# Patient Record
Sex: FEMALE | Race: WHITE | NOT HISPANIC OR LATINO | Employment: FULL TIME | URBAN - NONMETROPOLITAN AREA
[De-identification: names, ages, dates, MRNs, and addresses within clinical notes are randomized per-mention and may not be internally consistent; named-entity substitution may affect disease eponyms.]

---

## 2017-07-17 ENCOUNTER — TRANSCRIBE ORDERS (OUTPATIENT)
Dept: ADMINISTRATIVE | Facility: HOSPITAL | Age: 57
End: 2017-07-17

## 2017-07-17 DIAGNOSIS — Z12.31 VISIT FOR SCREENING MAMMOGRAM: Primary | ICD-10-CM

## 2017-07-17 DIAGNOSIS — M81.0 OSTEOPOROSIS, UNSPECIFIED: ICD-10-CM

## 2017-08-09 ENCOUNTER — HOSPITAL ENCOUNTER (OUTPATIENT)
Dept: MAMMOGRAPHY | Facility: HOSPITAL | Age: 57
Discharge: HOME | End: 2017-08-09
Admitting: NURSE PRACTITIONER

## 2017-08-09 ENCOUNTER — HOSPITAL ENCOUNTER (OUTPATIENT)
Dept: BONE DENSITY | Facility: HOSPITAL | Age: 57
Discharge: HOME | End: 2017-08-09

## 2017-08-09 DIAGNOSIS — M81.0 OSTEOPOROSIS, UNSPECIFIED: ICD-10-CM

## 2017-08-09 DIAGNOSIS — Z12.31 VISIT FOR SCREENING MAMMOGRAM: ICD-10-CM

## 2017-08-09 PROCEDURE — 77080 DXA BONE DENSITY AXIAL: CPT

## 2017-08-09 PROCEDURE — 77063 BREAST TOMOSYNTHESIS BI: CPT | Performed by: RADIOLOGY

## 2017-08-09 PROCEDURE — 77067 SCR MAMMO BI INCL CAD: CPT | Performed by: RADIOLOGY

## 2017-08-09 PROCEDURE — 77080 DXA BONE DENSITY AXIAL: CPT | Performed by: RADIOLOGY

## 2017-08-09 PROCEDURE — G0202 SCR MAMMO BI INCL CAD: HCPCS

## 2017-08-09 PROCEDURE — 77063 BREAST TOMOSYNTHESIS BI: CPT

## 2018-09-11 ENCOUNTER — OFFICE VISIT (OUTPATIENT)
Dept: SURGERY | Facility: CLINIC | Age: 58
End: 2018-09-11

## 2018-09-11 VITALS
HEART RATE: 68 BPM | BODY MASS INDEX: 24.89 KG/M2 | TEMPERATURE: 98.6 F | DIASTOLIC BLOOD PRESSURE: 93 MMHG | SYSTOLIC BLOOD PRESSURE: 137 MMHG | RESPIRATION RATE: 18 BRPM | WEIGHT: 149.4 LBS | OXYGEN SATURATION: 96 % | HEIGHT: 65 IN

## 2018-09-11 DIAGNOSIS — I83.813 VARICOSE VEINS OF BOTH LOWER EXTREMITIES WITH PAIN: Primary | ICD-10-CM

## 2018-09-11 PROCEDURE — 99203 OFFICE O/P NEW LOW 30 MIN: CPT | Performed by: SURGERY

## 2018-09-11 RX ORDER — LEVOCETIRIZINE DIHYDROCHLORIDE 5 MG/1
TABLET, FILM COATED ORAL
Refills: 5 | COMMUNITY
Start: 2018-08-11

## 2018-09-11 RX ORDER — DOCUSATE SODIUM 100 MG/1
100 CAPSULE, LIQUID FILLED ORAL 2 TIMES DAILY
COMMUNITY

## 2018-09-11 RX ORDER — LEVOTHYROXINE SODIUM 88 UG/1
TABLET ORAL
COMMUNITY
Start: 2018-07-03

## 2018-09-11 RX ORDER — VIT C/B6/B5/MAGNESIUM/HERB 173 50-5-6-5MG
CAPSULE ORAL
COMMUNITY

## 2018-09-11 RX ORDER — VITAMIN B COMPLEX
CAPSULE ORAL DAILY
COMMUNITY

## 2018-09-11 NOTE — PROGRESS NOTES
9/11/2018    Patient Information  Annmarie Hopkins  [address]  [place] [address]  1960  [phone] (home)     Chief Complaint   Patient presents with   • Consult     REFERRED FOR VERICOSE VEINS CONSULT       HPI  Patient is a 58-year-old white female concerned about spider varicosities she is has bilateral lower legs.  She is also concerned about her leg circulation.  Her sister has had problems with her carotid arteries and she is concerned she has problems with circulation in her legs as well.  She denies MI, stroke, diabetes, hypertension, TIAs, chest pain.  She denies claudication.  She is having some discomfort in bilateral lower legs    Review of Systems   Constitutional: Positive for unexpected weight change.   HENT: Positive for sore throat and voice change.    Eyes: Positive for visual disturbance.   Respiratory: Negative.    Cardiovascular: Negative.    Gastrointestinal: Negative.    Endocrine: Negative.    Genitourinary: Negative.    Musculoskeletal: Negative.    Skin: Negative.    Allergic/Immunologic: Negative.    Neurological: Positive for numbness.   Hematological: Bruises/bleeds easily.   Psychiatric/Behavioral: Positive for sleep disturbance.     The Review of Systems has been reviewed and confirmed.    There is no problem list on file for this patient.        Past Medical History:   Diagnosis Date   • Arthritis    • Hypothyroid          Past Surgical History:   Procedure Laterality Date   • LAPAROSCOPIC TUBAL LIGATION     • THYROIDECTOMY  2014         Family History   Problem Relation Age of Onset   • Cancer Mother    • Hypertension Father    • Heart disease Father    • Diabetes Father    • Heart disease Paternal Grandfather          Social History   Substance Use Topics   • Smoking status: Never Smoker   • Smokeless tobacco: Not on file   • Alcohol use Not on file       Current Outpatient Prescriptions   Medication Sig Dispense Refill   • B Complex Vitamins (VITAMIN B COMPLEX) capsule  "capsule Take  by mouth Daily.     • Calcium Carb-Cholecalciferol (CALCIUM 600+D3) 600-200 MG-UNIT tablet Take  by mouth.     • docusate sodium (STOOL SOFTENER) 100 MG capsule Take 100 mg by mouth 2 (Two) Times a Day.     • levocetirizine (XYZAL) 5 MG tablet TAKE 1 2 TO 1 TABLET BY MOUTH EVERY EVENING  5   • levothyroxine (SYNTHROID, LEVOTHROID) 88 MCG tablet      • Turmeric 500 MG capsule Take  by mouth.       No current facility-administered medications for this visit.          Allergies  Morphine    /93   Pulse 68   Temp 98.6 °F (37 °C)   Resp 18   Ht 165.1 cm (65\")   Wt 67.8 kg (149 lb 6.4 oz)   SpO2 96%   BMI 24.86 kg/m²      Physical Exam   Constitutional: She is oriented to person, place, and time. She appears well-developed and well-nourished. No distress.   HENT:   Head: Normocephalic.   Right Ear: External ear normal.   Left Ear: External ear normal.   Nose: Nose normal.   Mouth/Throat: Oropharynx is clear and moist.   Eyes: Conjunctivae and EOM are normal. Right eye exhibits no discharge. Left eye exhibits no discharge.   Neck: Normal range of motion. No JVD present. No tracheal deviation present. No thyromegaly present.   Cardiovascular: Normal rate, regular rhythm, normal heart sounds and intact distal pulses.  Exam reveals no gallop and no friction rub.    No murmur heard.  Excellent pedal pulses.  Multiple spider varicosities both legs   Pulmonary/Chest: Effort normal and breath sounds normal. No stridor. No respiratory distress. She has no wheezes. She has no rales. She exhibits no tenderness.   Abdominal: Soft. Bowel sounds are normal. She exhibits no distension and no mass. There is no tenderness. There is no rebound and no guarding. No hernia.   Genitourinary: Rectal exam shows guaiac negative stool.   Musculoskeletal: Normal range of motion. She exhibits no edema, tenderness or deformity.   Lymphadenopathy:     She has no cervical adenopathy.   Neurological: She is alert and oriented " to person, place, and time. She has normal reflexes. She displays normal reflexes. No cranial nerve deficit. She exhibits normal muscle tone. Coordination normal.   Skin: Skin is warm and dry. No rash noted. She is not diaphoretic. No erythema. No pallor.   Psychiatric: She has a normal mood and affect. Her behavior is normal. Judgment and thought content normal.             Assessment   Spider varicosities both legs with concerned about circulation to lower extremities.        Plan     Venous Doppler study bilateral lower extremities, bilateral ABIs  \    Patient's Body mass index is 24.86 kg/m². BMI is within normal parameters. No follow-up required.      Dexter Bee MD

## 2018-09-12 ENCOUNTER — HOSPITAL ENCOUNTER (OUTPATIENT)
Dept: MAMMOGRAPHY | Facility: HOSPITAL | Age: 58
Discharge: HOME | End: 2018-09-12
Admitting: NURSE PRACTITIONER

## 2018-09-12 DIAGNOSIS — Z12.39 SCREENING BREAST EXAMINATION: ICD-10-CM

## 2018-09-12 PROCEDURE — 77063 BREAST TOMOSYNTHESIS BI: CPT

## 2018-09-12 PROCEDURE — 77063 BREAST TOMOSYNTHESIS BI: CPT | Performed by: RADIOLOGY

## 2018-09-12 PROCEDURE — 77067 SCR MAMMO BI INCL CAD: CPT

## 2018-09-12 PROCEDURE — 77067 SCR MAMMO BI INCL CAD: CPT | Performed by: RADIOLOGY

## 2018-09-13 ENCOUNTER — TELEPHONE (OUTPATIENT)
Dept: SURGERY | Facility: CLINIC | Age: 58
End: 2018-09-13

## 2019-09-25 ENCOUNTER — HOSPITAL ENCOUNTER (OUTPATIENT)
Dept: MAMMOGRAPHY | Facility: HOSPITAL | Age: 59
Discharge: HOME | End: 2019-09-25
Admitting: NURSE PRACTITIONER

## 2019-09-25 DIAGNOSIS — Z12.39 SCREENING BREAST EXAMINATION: ICD-10-CM

## 2019-09-25 PROCEDURE — 77063 BREAST TOMOSYNTHESIS BI: CPT

## 2019-09-25 PROCEDURE — 77067 SCR MAMMO BI INCL CAD: CPT

## 2019-09-25 PROCEDURE — 77063 BREAST TOMOSYNTHESIS BI: CPT | Performed by: RADIOLOGY

## 2019-09-25 PROCEDURE — 77067 SCR MAMMO BI INCL CAD: CPT | Performed by: RADIOLOGY

## 2020-06-04 ENCOUNTER — HOSPITAL ENCOUNTER (OUTPATIENT)
Dept: GENERAL RADIOLOGY | Facility: HOSPITAL | Age: 60
Discharge: HOME | End: 2020-06-04
Admitting: NURSE PRACTITIONER

## 2020-06-04 ENCOUNTER — TRANSCRIBE ORDERS (OUTPATIENT)
Dept: ADMINISTRATIVE | Facility: HOSPITAL | Age: 60
End: 2020-06-04

## 2020-06-04 DIAGNOSIS — R07.9 CHEST PAIN, UNSPECIFIED TYPE: Primary | ICD-10-CM

## 2020-06-04 DIAGNOSIS — R07.9 CHEST PAIN, UNSPECIFIED TYPE: ICD-10-CM

## 2020-06-04 PROCEDURE — 71046 X-RAY EXAM CHEST 2 VIEWS: CPT

## 2020-06-04 PROCEDURE — 71046 X-RAY EXAM CHEST 2 VIEWS: CPT | Performed by: RADIOLOGY

## 2020-06-05 ENCOUNTER — TRANSCRIBE ORDERS (OUTPATIENT)
Dept: ADMINISTRATIVE | Facility: HOSPITAL | Age: 60
End: 2020-06-05

## 2020-06-05 ENCOUNTER — LAB (OUTPATIENT)
Dept: LAB | Facility: HOSPITAL | Age: 60
End: 2020-06-05

## 2020-06-05 DIAGNOSIS — R06.00 ACUTE DYSPNEA: Primary | ICD-10-CM

## 2020-06-05 DIAGNOSIS — M94.0 COSTOCHONDRITIS, ACUTE: ICD-10-CM

## 2020-06-05 DIAGNOSIS — R06.00 ACUTE DYSPNEA: ICD-10-CM

## 2020-06-05 PROCEDURE — U0003 INFECTIOUS AGENT DETECTION BY NUCLEIC ACID (DNA OR RNA); SEVERE ACUTE RESPIRATORY SYNDROME CORONAVIRUS 2 (SARS-COV-2) (CORONAVIRUS DISEASE [COVID-19]), AMPLIFIED PROBE TECHNIQUE, MAKING USE OF HIGH THROUGHPUT TECHNOLOGIES AS DESCRIBED BY CMS-2020-01-R: HCPCS

## 2020-06-07 LAB — SARS-COV-2 RNA RESP QL NAA+PROBE: NOT DETECTED

## 2020-11-23 ENCOUNTER — HOSPITAL ENCOUNTER (OUTPATIENT)
Dept: MAMMOGRAPHY | Facility: HOSPITAL | Age: 60
Discharge: HOME | End: 2020-11-23
Admitting: NURSE PRACTITIONER

## 2020-11-23 DIAGNOSIS — Z12.31 VISIT FOR SCREENING MAMMOGRAM: ICD-10-CM

## 2020-11-23 PROCEDURE — 77063 BREAST TOMOSYNTHESIS BI: CPT | Performed by: RADIOLOGY

## 2020-11-23 PROCEDURE — 77067 SCR MAMMO BI INCL CAD: CPT | Performed by: RADIOLOGY

## 2020-11-23 PROCEDURE — 77067 SCR MAMMO BI INCL CAD: CPT

## 2020-11-23 PROCEDURE — 77063 BREAST TOMOSYNTHESIS BI: CPT

## 2021-03-18 ENCOUNTER — BULK ORDERING (OUTPATIENT)
Dept: CASE MANAGEMENT | Facility: OTHER | Age: 61
End: 2021-03-18

## 2021-03-18 DIAGNOSIS — Z23 IMMUNIZATION DUE: ICD-10-CM

## 2021-04-29 ENCOUNTER — TRANSCRIBE ORDERS (OUTPATIENT)
Dept: ADMINISTRATIVE | Facility: HOSPITAL | Age: 61
End: 2021-04-29

## 2021-04-29 DIAGNOSIS — Z01.818 OTHER SPECIFIED PRE-OPERATIVE EXAMINATION: Primary | ICD-10-CM

## 2021-04-30 ENCOUNTER — LAB (OUTPATIENT)
Dept: LAB | Facility: HOSPITAL | Age: 61
End: 2021-04-30

## 2021-04-30 DIAGNOSIS — Z01.818 OTHER SPECIFIED PRE-OPERATIVE EXAMINATION: ICD-10-CM

## 2021-04-30 PROCEDURE — C9803 HOPD COVID-19 SPEC COLLECT: HCPCS

## 2021-04-30 PROCEDURE — U0004 COV-19 TEST NON-CDC HGH THRU: HCPCS

## 2021-05-01 LAB — SARS-COV-2 RNA NOSE QL NAA+PROBE: NOT DETECTED

## 2021-06-07 ENCOUNTER — HOSPITAL ENCOUNTER (OUTPATIENT)
Dept: BONE DENSITY | Facility: HOSPITAL | Age: 61
Discharge: HOME | End: 2021-06-07
Admitting: NURSE PRACTITIONER

## 2021-06-07 DIAGNOSIS — M81.0 OSTEOPOROSIS, POST-MENOPAUSAL: ICD-10-CM

## 2021-06-07 PROCEDURE — 77080 DXA BONE DENSITY AXIAL: CPT

## 2021-06-07 PROCEDURE — 77080 DXA BONE DENSITY AXIAL: CPT | Performed by: RADIOLOGY

## 2021-12-10 ENCOUNTER — TRANSCRIBE ORDERS (OUTPATIENT)
Dept: ADMINISTRATIVE | Facility: HOSPITAL | Age: 61
End: 2021-12-10

## 2021-12-10 DIAGNOSIS — I83.811 VARICOSE VEINS OF RIGHT LOWER EXTREMITY WITH PAIN: Primary | ICD-10-CM

## 2021-12-17 ENCOUNTER — HOSPITAL ENCOUNTER (OUTPATIENT)
Dept: CARDIOLOGY | Facility: HOSPITAL | Age: 61
Discharge: HOME | End: 2021-12-17
Admitting: SURGERY

## 2021-12-17 VITALS — BODY MASS INDEX: 23.32 KG/M2 | HEIGHT: 65 IN | WEIGHT: 140 LBS

## 2021-12-17 DIAGNOSIS — I83.811 VARICOSE VEINS OF RIGHT LOWER EXTREMITY WITH PAIN: ICD-10-CM

## 2021-12-17 PROCEDURE — 93971 EXTREMITY STUDY: CPT

## 2021-12-18 LAB
BH CV LOW VAS RIGHT GREATER SAPH AK VESSEL: 1
BH CV LOW VAS RIGHT GREATER SAPH BK VESSEL: 1
BH CV LOW VAS RIGHT GSV DIST CALF VESSEL: 1
BH CV LOW VAS RIGHT GSV DIST THIGH VESSEL: 1
BH CV LOW VAS RIGHT GSV MID CALF VESSEL: 1
BH CV LOW VAS RIGHT GSV MID THIGH VESSEL: 1
BH CV LOW VAS RIGHT SAPHENOFEM VESSEL: 1
BH CV LOW VAS RIGHT VARICOSITY BK VESSEL: 1
BH CV LOWER VAS RIGHT GSV DIST THIGH COMPRESSIBILTY: NORMAL
BH CV LOWER VAS RIGHT GSV MID CALF COMPRESSIBILTY: NORMAL
BH CV LOWER VAS RIGHT GSV MID THIGH COMPRESSIBILTY: NORMAL
BH CV LOWER VASCULAR RIGHT AA GSV COMPETENT: NORMAL
BH CV LOWER VASCULAR RIGHT AA GSV COMPRESS: NORMAL
BH CV LOWER VASCULAR RIGHT COMMON FEMORAL AUGMENT: NORMAL
BH CV LOWER VASCULAR RIGHT COMMON FEMORAL COMPETENT: NORMAL
BH CV LOWER VASCULAR RIGHT COMMON FEMORAL COMPRESS: NORMAL
BH CV LOWER VASCULAR RIGHT COMMON FEMORAL PHASIC: NORMAL
BH CV LOWER VASCULAR RIGHT COMMON FEMORAL SPONT: NORMAL
BH CV LOWER VASCULAR RIGHT DISTAL FEMORAL AUGMENT: NORMAL
BH CV LOWER VASCULAR RIGHT DISTAL FEMORAL COMPETENT: NORMAL
BH CV LOWER VASCULAR RIGHT DISTAL FEMORAL COMPRESS: NORMAL
BH CV LOWER VASCULAR RIGHT DISTAL FEMORAL PHASIC: NORMAL
BH CV LOWER VASCULAR RIGHT DISTAL FEMORAL SPONT: NORMAL
BH CV LOWER VASCULAR RIGHT GREATER SAPH AK COMPETENT: NORMAL
BH CV LOWER VASCULAR RIGHT GREATER SAPH BK COMPETENT: NORMAL
BH CV LOWER VASCULAR RIGHT GREATER SAPH BK COMPRESS: NORMAL
BH CV LOWER VASCULAR RIGHT GSV DIST THIGH COMPETENT: NORMAL
BH CV LOWER VASCULAR RIGHT GSV MID CALF COMPETENT: NORMAL
BH CV LOWER VASCULAR RIGHT GSV MID THIGH COMPETENT: NORMAL
BH CV LOWER VASCULAR RIGHT MID FEMORAL AUGMENT: NORMAL
BH CV LOWER VASCULAR RIGHT MID FEMORAL COMPETENT: NORMAL
BH CV LOWER VASCULAR RIGHT MID FEMORAL COMPRESS: NORMAL
BH CV LOWER VASCULAR RIGHT MID FEMORAL PHASIC: NORMAL
BH CV LOWER VASCULAR RIGHT MID FEMORAL SPONT: NORMAL
BH CV LOWER VASCULAR RIGHT PERONEAL COMPRESS: NORMAL
BH CV LOWER VASCULAR RIGHT POPLITEAL AUGMENT: NORMAL
BH CV LOWER VASCULAR RIGHT POPLITEAL COMPETENT: NORMAL
BH CV LOWER VASCULAR RIGHT POPLITEAL COMPRESS: NORMAL
BH CV LOWER VASCULAR RIGHT POPLITEAL PHASIC: NORMAL
BH CV LOWER VASCULAR RIGHT POPLITEAL SPONT: NORMAL
BH CV LOWER VASCULAR RIGHT POSTERIOR TIBIAL COMPRESS: NORMAL
BH CV LOWER VASCULAR RIGHT PROXIMAL FEMORAL AUGMENT: NORMAL
BH CV LOWER VASCULAR RIGHT PROXIMAL FEMORAL COMPETENT: NORMAL
BH CV LOWER VASCULAR RIGHT PROXIMAL FEMORAL COMPRESS: NORMAL
BH CV LOWER VASCULAR RIGHT PROXIMAL FEMORAL PHASIC: NORMAL
BH CV LOWER VASCULAR RIGHT PROXIMAL FEMORAL SPONT: NORMAL
BH CV LOWER VASCULAR RIGHT SAPHENOFEMORAL JUNCTION AUGMENT: NORMAL
BH CV LOWER VASCULAR RIGHT SAPHENOFEMORAL JUNCTION COMPETENT: NORMAL
BH CV LOWER VASCULAR RIGHT SAPHENOFEMORAL JUNCTION COMPRESS: NORMAL
BH CV LOWER VASCULAR RIGHT SAPHENOFEMORAL JUNCTION PHASIC: NORMAL
BH CV LOWER VASCULAR RIGHT SAPHENOFEMORAL JUNCTION SPONT: NORMAL
BH CV LOWER VASCULAR RIGHT SAPHENOPOP JX AUGMENT: NORMAL
BH CV LOWER VASCULAR RIGHT SAPHENOPOP JX COMPETENT: NORMAL
BH CV LOWER VASCULAR RIGHT SAPHENOPOP JX COMPRESS: NORMAL
BH CV LOWER VASCULAR RIGHT SAPHENOPOP JX PHASIC: NORMAL
BH CV LOWER VASCULAR RIGHT SAPHENOPOP JX SPONT: NORMAL
BH CV LOWER VASCULAR RIGHT SSV MID CALF COMPETENT: NORMAL
BH CV LOWER VASCULAR RIGHT SSV MID CALF COMPRESS: NORMAL
BH CV LOWER VASCULAR RIGHT VARICOSITY BK COMPETENT: NORMAL
BH CV LOWER VASCULAR RIGHT VARICOSITY BK COMPRESS: NORMAL
BH CV RIGHT LOWER VAS AA GSV REFLUX TIME: 0 MSEC
BH CV RIGHT LOWER VAS AA GSV TRANS DIAMETER: 0.4 CM
BH CV RIGHT LOWER VAS GSV KNEE REFLUX TIME: 5340 MSEC
BH CV RIGHT LOWER VAS GSV KNEE TRANS DIAMETER: 0.6 CM
BH CV RIGHT LOWER VAS GSV PROX CALF REFLUX TIME: 3286 MSEC
BH CV RIGHT LOWER VAS GSV PROX CALF TRANS DIAMETER: 5 CM
BH CV RIGHT LOWER VAS GSV PROX THIGH REFLUX TIME: 4856 MSEC
BH CV RIGHT LOWER VAS GSV PROX THIGH TRANS DIAMETER: 0.6 CM
BH CV RIGHT LOWER VAS SAPHENOFEM JUNCTION REFLUX TIME: 4474 MSEC
BH CV RIGHT LOWER VAS SAPHENOFEM JUNCTION TRANSVERSE DIAMETER: 0.8 CM
BH CV RIGHT LOWER VAS SPJ REFLUX TIME: 0 MSEC
BH CV RIGHT LOWER VAS SPJ TRANS DIAMETER: 0.4 CM
BH CV RIGHT LOWER VAS SSV MID CALF REFLUX TIME: 0 MSEC
BH CV RIGHT LOWER VAS SSV MID CALF TRANS DIAMETER: 0.41 CM
BH CV RIGHT LOWER VAS VARICOSITY BK TRANS DIAMETER: 0.71 CM
BH CV VAS RIGHT GSV PROXIMAL HIDDEN LRR COMPRESSIBILTY: NORMAL

## 2022-01-05 ENCOUNTER — HOSPITAL ENCOUNTER (OUTPATIENT)
Dept: MAMMOGRAPHY | Facility: HOSPITAL | Age: 62
Discharge: HOME | End: 2022-01-05
Admitting: NURSE PRACTITIONER

## 2022-01-05 DIAGNOSIS — Z12.31 VISIT FOR SCREENING MAMMOGRAM: ICD-10-CM

## 2022-01-05 PROCEDURE — 77063 BREAST TOMOSYNTHESIS BI: CPT

## 2022-01-05 PROCEDURE — 77063 BREAST TOMOSYNTHESIS BI: CPT | Performed by: RADIOLOGY

## 2022-01-05 PROCEDURE — 77067 SCR MAMMO BI INCL CAD: CPT | Performed by: RADIOLOGY

## 2022-01-05 PROCEDURE — 77067 SCR MAMMO BI INCL CAD: CPT

## 2022-08-26 ENCOUNTER — TRANSCRIBE ORDERS (OUTPATIENT)
Dept: ADMINISTRATIVE | Facility: HOSPITAL | Age: 62
End: 2022-08-26

## 2022-08-26 DIAGNOSIS — I83.811 VARICOSE VEINS OF RIGHT LOWER EXTREMITY WITH PAIN: Primary | ICD-10-CM

## 2022-09-07 ENCOUNTER — HOSPITAL ENCOUNTER (OUTPATIENT)
Dept: CARDIOLOGY | Facility: HOSPITAL | Age: 62
Discharge: HOME | End: 2022-09-07
Admitting: SURGERY

## 2022-09-07 VITALS — BODY MASS INDEX: 24.16 KG/M2 | HEIGHT: 65 IN | WEIGHT: 145 LBS

## 2022-09-07 DIAGNOSIS — I83.811 VARICOSE VEINS OF RIGHT LOWER EXTREMITY WITH PAIN: ICD-10-CM

## 2022-09-07 PROCEDURE — 93971 EXTREMITY STUDY: CPT

## 2022-09-07 PROCEDURE — 93971 EXTREMITY STUDY: CPT | Performed by: INTERNAL MEDICINE

## 2022-09-08 LAB
BH CV LOW VAS RIGHT GREAT SAPH AK CM FIELD: 1.2 CM
BH CV LOW VAS RIGHT GREATER SAPH AK VESSEL: 1
BH CV LOW VAS RIGHT GREATER SAPH BK VESSEL: 1
BH CV LOW VAS RIGHT VARICOSITY BK VESSEL: 1
BH CV LOWER VASCULAR RIGHT COMMON FEMORAL AUGMENT: NORMAL
BH CV LOWER VASCULAR RIGHT COMMON FEMORAL COMPETENT: NORMAL
BH CV LOWER VASCULAR RIGHT COMMON FEMORAL COMPRESS: NORMAL
BH CV LOWER VASCULAR RIGHT COMMON FEMORAL PHASIC: NORMAL
BH CV LOWER VASCULAR RIGHT COMMON FEMORAL SPONT: NORMAL
BH CV LOWER VASCULAR RIGHT DISTAL FEMORAL AUGMENT: NORMAL
BH CV LOWER VASCULAR RIGHT DISTAL FEMORAL COMPETENT: NORMAL
BH CV LOWER VASCULAR RIGHT DISTAL FEMORAL COMPRESS: NORMAL
BH CV LOWER VASCULAR RIGHT DISTAL FEMORAL PHASIC: NORMAL
BH CV LOWER VASCULAR RIGHT DISTAL FEMORAL SPONT: NORMAL
BH CV LOWER VASCULAR RIGHT GASTRONEMIUS COMPRESS: NORMAL
BH CV LOWER VASCULAR RIGHT GREATER SAPH AK COMPRESS: NORMAL
BH CV LOWER VASCULAR RIGHT GREATER SAPH AK THROMBUS: NORMAL
BH CV LOWER VASCULAR RIGHT GREATER SAPH BK COMPRESS: NORMAL
BH CV LOWER VASCULAR RIGHT GREATER SAPH BK THROMBUS: NORMAL
BH CV LOWER VASCULAR RIGHT LESSER SAPH COMPRESS: NORMAL
BH CV LOWER VASCULAR RIGHT MID FEMORAL AUGMENT: NORMAL
BH CV LOWER VASCULAR RIGHT MID FEMORAL COMPETENT: NORMAL
BH CV LOWER VASCULAR RIGHT MID FEMORAL COMPRESS: NORMAL
BH CV LOWER VASCULAR RIGHT MID FEMORAL PHASIC: NORMAL
BH CV LOWER VASCULAR RIGHT MID FEMORAL SPONT: NORMAL
BH CV LOWER VASCULAR RIGHT PERONEAL COMPRESS: NORMAL
BH CV LOWER VASCULAR RIGHT POPLITEAL AUGMENT: NORMAL
BH CV LOWER VASCULAR RIGHT POPLITEAL COMPETENT: NORMAL
BH CV LOWER VASCULAR RIGHT POPLITEAL COMPRESS: NORMAL
BH CV LOWER VASCULAR RIGHT POPLITEAL PHASIC: NORMAL
BH CV LOWER VASCULAR RIGHT POPLITEAL SPONT: NORMAL
BH CV LOWER VASCULAR RIGHT POSTERIOR TIBIAL COMPRESS: NORMAL
BH CV LOWER VASCULAR RIGHT PROFUNDA FEMORAL AUGMENT: NORMAL
BH CV LOWER VASCULAR RIGHT PROFUNDA FEMORAL COMPETENT: NORMAL
BH CV LOWER VASCULAR RIGHT PROFUNDA FEMORAL COMPRESS: NORMAL
BH CV LOWER VASCULAR RIGHT PROFUNDA FEMORAL PHASIC: NORMAL
BH CV LOWER VASCULAR RIGHT PROFUNDA FEMORAL SPONT: NORMAL
BH CV LOWER VASCULAR RIGHT PROXIMAL FEMORAL AUGMENT: NORMAL
BH CV LOWER VASCULAR RIGHT PROXIMAL FEMORAL COMPETENT: NORMAL
BH CV LOWER VASCULAR RIGHT PROXIMAL FEMORAL COMPRESS: NORMAL
BH CV LOWER VASCULAR RIGHT PROXIMAL FEMORAL PHASIC: NORMAL
BH CV LOWER VASCULAR RIGHT PROXIMAL FEMORAL SPONT: NORMAL
BH CV LOWER VASCULAR RIGHT SAPHENOFEMORAL JUNCTION COMPRESS: NORMAL
BH CV LOWER VASCULAR RIGHT SAPHENOFEMORAL JUNCTION PHASIC: NORMAL
BH CV LOWER VASCULAR RIGHT SAPHENOFEMORAL JUNCTION SPONT: NORMAL
BH CV LOWER VASCULAR RIGHT VARICOSITY BK COMPETENT: NORMAL
BH CV LOWER VASCULAR RIGHT VARICOSITY BK COMPRESS: NORMAL
BH CV VAS STUDY COMMENTS NUMBER OF DAYS POST EVLT: 5
MAXIMAL PREDICTED HEART RATE: 159 BPM
STRESS TARGET HR: 135 BPM

## 2022-09-30 ENCOUNTER — TRANSCRIBE ORDERS (OUTPATIENT)
Dept: ADMINISTRATIVE | Facility: HOSPITAL | Age: 62
End: 2022-09-30

## 2022-09-30 ENCOUNTER — HOSPITAL ENCOUNTER (OUTPATIENT)
Dept: CARDIOLOGY | Facility: HOSPITAL | Age: 62
Discharge: HOME | End: 2022-09-30
Admitting: SURGERY

## 2022-09-30 VITALS — BODY MASS INDEX: 24.17 KG/M2 | HEIGHT: 65 IN | WEIGHT: 145.06 LBS

## 2022-09-30 DIAGNOSIS — I83.811 VARICOSE VEINS OF RIGHT LOWER EXTREMITY WITH PAIN: ICD-10-CM

## 2022-09-30 DIAGNOSIS — I83.812 VARICOSE VEINS OF LEFT LOWER EXTREMITY WITH PAIN: Primary | ICD-10-CM

## 2022-09-30 LAB
BH CV LOW VAS RIGHT GREAT SAPH AK CM FIELD: 1.2 CM
BH CV LOW VAS RIGHT GREATER SAPH AK VESSEL: 1
BH CV LOW VAS RIGHT GREATER SAPH BK VESSEL: 1
BH CV LOW VAS RIGHT VARICOSITY BK VESSEL: 1
BH CV LOWER VASCULAR LEFT COMMON FEMORAL AUGMENT: NORMAL
BH CV LOWER VASCULAR LEFT COMMON FEMORAL COMPETENT: NORMAL
BH CV LOWER VASCULAR LEFT COMMON FEMORAL COMPRESS: NORMAL
BH CV LOWER VASCULAR LEFT COMMON FEMORAL PHASIC: NORMAL
BH CV LOWER VASCULAR LEFT COMMON FEMORAL SPONT: NORMAL
BH CV LOWER VASCULAR RIGHT COMMON FEMORAL AUGMENT: NORMAL
BH CV LOWER VASCULAR RIGHT COMMON FEMORAL COMPETENT: NORMAL
BH CV LOWER VASCULAR RIGHT COMMON FEMORAL COMPRESS: NORMAL
BH CV LOWER VASCULAR RIGHT COMMON FEMORAL PHASIC: NORMAL
BH CV LOWER VASCULAR RIGHT COMMON FEMORAL SPONT: NORMAL
BH CV LOWER VASCULAR RIGHT DISTAL FEMORAL AUGMENT: NORMAL
BH CV LOWER VASCULAR RIGHT DISTAL FEMORAL COMPETENT: NORMAL
BH CV LOWER VASCULAR RIGHT DISTAL FEMORAL COMPRESS: NORMAL
BH CV LOWER VASCULAR RIGHT DISTAL FEMORAL PHASIC: NORMAL
BH CV LOWER VASCULAR RIGHT DISTAL FEMORAL SPONT: NORMAL
BH CV LOWER VASCULAR RIGHT GASTRONEMIUS COMPRESS: NORMAL
BH CV LOWER VASCULAR RIGHT GREATER SAPH AK COMPRESS: NORMAL
BH CV LOWER VASCULAR RIGHT GREATER SAPH AK THROMBUS: NORMAL
BH CV LOWER VASCULAR RIGHT GREATER SAPH BK COMPRESS: NORMAL
BH CV LOWER VASCULAR RIGHT GREATER SAPH BK THROMBUS: NORMAL
BH CV LOWER VASCULAR RIGHT LESSER SAPH COMPRESS: NORMAL
BH CV LOWER VASCULAR RIGHT MID FEMORAL AUGMENT: NORMAL
BH CV LOWER VASCULAR RIGHT MID FEMORAL COMPETENT: NORMAL
BH CV LOWER VASCULAR RIGHT MID FEMORAL COMPRESS: NORMAL
BH CV LOWER VASCULAR RIGHT MID FEMORAL PHASIC: NORMAL
BH CV LOWER VASCULAR RIGHT MID FEMORAL SPONT: NORMAL
BH CV LOWER VASCULAR RIGHT PERONEAL COMPRESS: NORMAL
BH CV LOWER VASCULAR RIGHT POPLITEAL AUGMENT: NORMAL
BH CV LOWER VASCULAR RIGHT POPLITEAL COMPETENT: NORMAL
BH CV LOWER VASCULAR RIGHT POPLITEAL COMPRESS: NORMAL
BH CV LOWER VASCULAR RIGHT POPLITEAL PHASIC: NORMAL
BH CV LOWER VASCULAR RIGHT POPLITEAL SPONT: NORMAL
BH CV LOWER VASCULAR RIGHT POSTERIOR TIBIAL COMPRESS: NORMAL
BH CV LOWER VASCULAR RIGHT PROFUNDA FEMORAL AUGMENT: NORMAL
BH CV LOWER VASCULAR RIGHT PROFUNDA FEMORAL COMPRESS: NORMAL
BH CV LOWER VASCULAR RIGHT PROFUNDA FEMORAL PHASIC: NORMAL
BH CV LOWER VASCULAR RIGHT PROFUNDA FEMORAL SPONT: NORMAL
BH CV LOWER VASCULAR RIGHT PROXIMAL FEMORAL AUGMENT: NORMAL
BH CV LOWER VASCULAR RIGHT PROXIMAL FEMORAL COMPETENT: NORMAL
BH CV LOWER VASCULAR RIGHT PROXIMAL FEMORAL COMPRESS: NORMAL
BH CV LOWER VASCULAR RIGHT PROXIMAL FEMORAL PHASIC: NORMAL
BH CV LOWER VASCULAR RIGHT PROXIMAL FEMORAL SPONT: NORMAL
BH CV LOWER VASCULAR RIGHT SAPHENOFEMORAL JUNCTION COMPRESS: NORMAL
BH CV LOWER VASCULAR RIGHT VARICOSITY BK COMPETENT: NORMAL
BH CV LOWER VASCULAR RIGHT VARICOSITY BK COMPRESS: NORMAL
BH CV VAS STUDY COMMENTS NUMBER OF DAYS POST EVLT: 30
MAXIMAL PREDICTED HEART RATE: 159 BPM
STRESS TARGET HR: 135 BPM

## 2022-09-30 PROCEDURE — 93971 EXTREMITY STUDY: CPT | Performed by: INTERNAL MEDICINE

## 2022-09-30 PROCEDURE — 93971 EXTREMITY STUDY: CPT

## 2022-10-06 ENCOUNTER — APPOINTMENT (OUTPATIENT)
Dept: CARDIOLOGY | Facility: HOSPITAL | Age: 62
End: 2022-10-06

## 2023-03-07 ENCOUNTER — HOSPITAL ENCOUNTER (OUTPATIENT)
Dept: MAMMOGRAPHY | Facility: HOSPITAL | Age: 63
Discharge: HOME | End: 2023-03-07
Payer: COMMERCIAL

## 2023-03-07 DIAGNOSIS — Z12.31 VISIT FOR SCREENING MAMMOGRAM: ICD-10-CM

## 2023-03-07 PROCEDURE — 77067 SCR MAMMO BI INCL CAD: CPT | Performed by: RADIOLOGY

## 2023-03-07 PROCEDURE — 77067 SCR MAMMO BI INCL CAD: CPT

## 2023-03-07 PROCEDURE — 77063 BREAST TOMOSYNTHESIS BI: CPT | Performed by: RADIOLOGY

## 2023-03-07 PROCEDURE — 77063 BREAST TOMOSYNTHESIS BI: CPT

## 2023-08-26 ENCOUNTER — HOSPITAL ENCOUNTER (EMERGENCY)
Facility: HOSPITAL | Age: 63
Discharge: HOME | End: 2023-08-26
Attending: STUDENT IN AN ORGANIZED HEALTH CARE EDUCATION/TRAINING PROGRAM
Payer: COMMERCIAL

## 2023-08-26 ENCOUNTER — APPOINTMENT (OUTPATIENT)
Dept: GENERAL RADIOLOGY | Facility: HOSPITAL | Age: 63
End: 2023-08-26
Payer: COMMERCIAL

## 2023-08-26 VITALS
HEIGHT: 66 IN | WEIGHT: 147 LBS | HEART RATE: 73 BPM | SYSTOLIC BLOOD PRESSURE: 110 MMHG | TEMPERATURE: 98.7 F | BODY MASS INDEX: 23.63 KG/M2 | OXYGEN SATURATION: 98 % | RESPIRATION RATE: 18 BRPM | DIASTOLIC BLOOD PRESSURE: 63 MMHG

## 2023-08-26 DIAGNOSIS — J06.9 UPPER RESPIRATORY INFECTION, VIRAL: ICD-10-CM

## 2023-08-26 DIAGNOSIS — U07.1 COVID-19: Primary | ICD-10-CM

## 2023-08-26 LAB
ALBUMIN SERPL-MCNC: 4.3 G/DL (ref 3.5–5.2)
ALBUMIN/GLOB SERPL: 1.6 G/DL
ALP SERPL-CCNC: 103 U/L (ref 39–117)
ALT SERPL W P-5'-P-CCNC: 16 U/L (ref 1–33)
ANION GAP SERPL CALCULATED.3IONS-SCNC: 14.3 MMOL/L (ref 5–15)
AST SERPL-CCNC: 16 U/L (ref 1–32)
BASOPHILS # BLD AUTO: 0.03 10*3/MM3 (ref 0–0.2)
BASOPHILS NFR BLD AUTO: 0.5 % (ref 0–1.5)
BILIRUB SERPL-MCNC: 0.6 MG/DL (ref 0–1.2)
BUN SERPL-MCNC: 7 MG/DL (ref 8–23)
BUN/CREAT SERPL: 8.8 (ref 7–25)
CALCIUM SPEC-SCNC: 9.5 MG/DL (ref 8.6–10.5)
CHLORIDE SERPL-SCNC: 102 MMOL/L (ref 98–107)
CO2 SERPL-SCNC: 22.7 MMOL/L (ref 22–29)
CREAT SERPL-MCNC: 0.8 MG/DL (ref 0.57–1)
DEPRECATED RDW RBC AUTO: 40.2 FL (ref 37–54)
EGFRCR SERPLBLD CKD-EPI 2021: 83.4 ML/MIN/1.73
EOSINOPHIL # BLD AUTO: 0.02 10*3/MM3 (ref 0–0.4)
EOSINOPHIL NFR BLD AUTO: 0.3 % (ref 0.3–6.2)
ERYTHROCYTE [DISTWIDTH] IN BLOOD BY AUTOMATED COUNT: 12.1 % (ref 12.3–15.4)
GLOBULIN UR ELPH-MCNC: 2.7 GM/DL
GLUCOSE SERPL-MCNC: 122 MG/DL (ref 65–99)
HCT VFR BLD AUTO: 44.3 % (ref 34–46.6)
HGB BLD-MCNC: 14.8 G/DL (ref 12–15.9)
IMM GRANULOCYTES # BLD AUTO: 0.01 10*3/MM3 (ref 0–0.05)
IMM GRANULOCYTES NFR BLD AUTO: 0.2 % (ref 0–0.5)
LYMPHOCYTES # BLD AUTO: 0.71 10*3/MM3 (ref 0.7–3.1)
LYMPHOCYTES NFR BLD AUTO: 11 % (ref 19.6–45.3)
MCH RBC QN AUTO: 30.2 PG (ref 26.6–33)
MCHC RBC AUTO-ENTMCNC: 33.4 G/DL (ref 31.5–35.7)
MCV RBC AUTO: 90.4 FL (ref 79–97)
MONOCYTES # BLD AUTO: 0.75 10*3/MM3 (ref 0.1–0.9)
MONOCYTES NFR BLD AUTO: 11.6 % (ref 5–12)
NEUTROPHILS NFR BLD AUTO: 4.93 10*3/MM3 (ref 1.7–7)
NEUTROPHILS NFR BLD AUTO: 76.4 % (ref 42.7–76)
NRBC BLD AUTO-RTO: 0 /100 WBC (ref 0–0.2)
PLATELET # BLD AUTO: 216 10*3/MM3 (ref 140–450)
PMV BLD AUTO: 9.3 FL (ref 6–12)
POTASSIUM SERPL-SCNC: 3.4 MMOL/L (ref 3.5–5.2)
PROT SERPL-MCNC: 7 G/DL (ref 6–8.5)
RBC # BLD AUTO: 4.9 10*6/MM3 (ref 3.77–5.28)
SODIUM SERPL-SCNC: 139 MMOL/L (ref 136–145)
TROPONIN T SERPL HS-MCNC: <6 NG/L
WBC NRBC COR # BLD: 6.45 10*3/MM3 (ref 3.4–10.8)

## 2023-08-26 PROCEDURE — 71045 X-RAY EXAM CHEST 1 VIEW: CPT | Performed by: RADIOLOGY

## 2023-08-26 PROCEDURE — 36415 COLL VENOUS BLD VENIPUNCTURE: CPT

## 2023-08-26 PROCEDURE — 71045 X-RAY EXAM CHEST 1 VIEW: CPT

## 2023-08-26 PROCEDURE — 93005 ELECTROCARDIOGRAM TRACING: CPT | Performed by: STUDENT IN AN ORGANIZED HEALTH CARE EDUCATION/TRAINING PROGRAM

## 2023-08-26 PROCEDURE — 84484 ASSAY OF TROPONIN QUANT: CPT | Performed by: STUDENT IN AN ORGANIZED HEALTH CARE EDUCATION/TRAINING PROGRAM

## 2023-08-26 PROCEDURE — 85025 COMPLETE CBC W/AUTO DIFF WBC: CPT | Performed by: STUDENT IN AN ORGANIZED HEALTH CARE EDUCATION/TRAINING PROGRAM

## 2023-08-26 PROCEDURE — 25010000002 DEXAMETHASONE SODIUM PHOSPHATE 10 MG/ML SOLUTION: Performed by: STUDENT IN AN ORGANIZED HEALTH CARE EDUCATION/TRAINING PROGRAM

## 2023-08-26 PROCEDURE — 96374 THER/PROPH/DIAG INJ IV PUSH: CPT

## 2023-08-26 PROCEDURE — 80053 COMPREHEN METABOLIC PANEL: CPT | Performed by: STUDENT IN AN ORGANIZED HEALTH CARE EDUCATION/TRAINING PROGRAM

## 2023-08-26 PROCEDURE — 99284 EMERGENCY DEPT VISIT MOD MDM: CPT

## 2023-08-26 RX ORDER — AZITHROMYCIN 250 MG/1
250 TABLET, FILM COATED ORAL DAILY
Qty: 4 TABLET | Refills: 0 | Status: SHIPPED | OUTPATIENT
Start: 2023-08-26

## 2023-08-26 RX ORDER — DEXAMETHASONE SODIUM PHOSPHATE 10 MG/ML
10 INJECTION, SOLUTION INTRAMUSCULAR; INTRAVENOUS ONCE
Status: COMPLETED | OUTPATIENT
Start: 2023-08-26 | End: 2023-08-26

## 2023-08-26 RX ORDER — ACETAMINOPHEN 500 MG
1000 TABLET ORAL ONCE
Status: COMPLETED | OUTPATIENT
Start: 2023-08-26 | End: 2023-08-26

## 2023-08-26 RX ORDER — AZITHROMYCIN 250 MG/1
500 TABLET, FILM COATED ORAL ONCE
Status: COMPLETED | OUTPATIENT
Start: 2023-08-26 | End: 2023-08-26

## 2023-08-26 RX ORDER — DEXAMETHASONE 6 MG/1
6 TABLET ORAL
Qty: 6 TABLET | Refills: 0 | Status: SHIPPED | OUTPATIENT
Start: 2023-08-26

## 2023-08-26 RX ADMIN — AZITHROMYCIN 500 MG: 250 TABLET, FILM COATED ORAL at 14:11

## 2023-08-26 RX ADMIN — DEXAMETHASONE SODIUM PHOSPHATE 10 MG: 10 INJECTION INTRAMUSCULAR; INTRAVENOUS at 13:18

## 2023-08-26 RX ADMIN — ACETAMINOPHEN 1000 MG: 500 TABLET ORAL at 11:54

## 2023-08-26 NOTE — ED PROVIDER NOTES
Subjective   History of Present Illness  63-year-old female with a past medical history of hypothyroidism and arthritis presents to the ER due to concerns for increasing fatigue, malaise, and fever over the last 2 to 3 days.  Patient notes she was recently diagnosed with COVID-19 at an outpatient care center.  Patient was sent to this facility to obtain a chest x-ray.  Patient notes some intermittent chest pain.  No coughing.  No radiating chest pain into the left arm or left neck.  Mild shortness of breath.  No nausea.  No diaphoresis.  No obvious aggravating or alleviating factors.  Vital signs stable    Review of Systems   Constitutional:  Positive for fatigue and fever.   Respiratory:  Positive for shortness of breath.    Cardiovascular:  Positive for chest pain.   Musculoskeletal:  Positive for myalgias.   All other systems reviewed and are negative.    Past Medical History:   Diagnosis Date    Arthritis     Hypothyroid        Allergies   Allergen Reactions    Morphine Anaphylaxis       Past Surgical History:   Procedure Laterality Date    LAPAROSCOPIC TUBAL LIGATION      THYROIDECTOMY  2014       Family History   Problem Relation Age of Onset    Cancer Mother     Hypertension Father     Heart disease Father     Diabetes Father     Heart disease Paternal Grandfather     Breast cancer Paternal Cousin        Social History     Socioeconomic History    Marital status:    Tobacco Use    Smoking status: Never           Objective   Physical Exam  Constitutional:       General: She is not in acute distress.     Appearance: Normal appearance. She is not ill-appearing.   HENT:      Head: Normocephalic and atraumatic.      Right Ear: External ear normal.      Left Ear: External ear normal.      Nose: Nose normal.      Mouth/Throat:      Mouth: Mucous membranes are moist.   Eyes:      Extraocular Movements: Extraocular movements intact.      Pupils: Pupils are equal, round, and reactive to light.   Cardiovascular:       Rate and Rhythm: Normal rate and regular rhythm.      Heart sounds: No murmur heard.  Pulmonary:      Effort: Pulmonary effort is normal. No respiratory distress.      Breath sounds: Normal breath sounds. No wheezing.   Abdominal:      General: Bowel sounds are normal.      Palpations: Abdomen is soft.      Tenderness: There is no abdominal tenderness.   Musculoskeletal:         General: No deformity or signs of injury. Normal range of motion.      Cervical back: Normal range of motion and neck supple.   Skin:     General: Skin is warm and dry.      Findings: No erythema.   Neurological:      General: No focal deficit present.      Mental Status: She is alert and oriented to person, place, and time. Mental status is at baseline.      Cranial Nerves: No cranial nerve deficit.   Psychiatric:         Mood and Affect: Mood normal.         Behavior: Behavior normal.         Thought Content: Thought content normal.       Procedures           ED Course  ED Course as of 08/26/23 1403   Sat Aug 26, 2023   1206 EKG none sinus rhythm.  95 bpm.  Right bundle branch block noted.  QTc 421.  No acute ST elevation.  Electronically signed by William Noriega DO, 08/26/23, 12:06 PM EDT.   [SF]      ED Course User Index  [SF] William Noriega DO      XR Chest 1 View    Result Date: 8/26/2023  No radiographic evidence of acute cardiac or pulmonary disease.  This report was finalized on 8/26/2023 12:56 PM by Dr. Félix Cuenca MD.       Results for orders placed or performed during the hospital encounter of 08/26/23   Comprehensive Metabolic Panel    Specimen: Blood   Result Value Ref Range    Glucose 122 (H) 65 - 99 mg/dL    BUN 7 (L) 8 - 23 mg/dL    Creatinine 0.80 0.57 - 1.00 mg/dL    Sodium 139 136 - 145 mmol/L    Potassium 3.4 (L) 3.5 - 5.2 mmol/L    Chloride 102 98 - 107 mmol/L    CO2 22.7 22.0 - 29.0 mmol/L    Calcium 9.5 8.6 - 10.5 mg/dL    Total Protein 7.0 6.0 - 8.5 g/dL    Albumin 4.3 3.5 - 5.2 g/dL    ALT (SGPT) 16 1 - 33  U/L    AST (SGOT) 16 1 - 32 U/L    Alkaline Phosphatase 103 39 - 117 U/L    Total Bilirubin 0.6 0.0 - 1.2 mg/dL    Globulin 2.7 gm/dL    A/G Ratio 1.6 g/dL    BUN/Creatinine Ratio 8.8 7.0 - 25.0    Anion Gap 14.3 5.0 - 15.0 mmol/L    eGFR 83.4 >60.0 mL/min/1.73   CBC Auto Differential    Specimen: Blood   Result Value Ref Range    WBC 6.45 3.40 - 10.80 10*3/mm3    RBC 4.90 3.77 - 5.28 10*6/mm3    Hemoglobin 14.8 12.0 - 15.9 g/dL    Hematocrit 44.3 34.0 - 46.6 %    MCV 90.4 79.0 - 97.0 fL    MCH 30.2 26.6 - 33.0 pg    MCHC 33.4 31.5 - 35.7 g/dL    RDW 12.1 (L) 12.3 - 15.4 %    RDW-SD 40.2 37.0 - 54.0 fl    MPV 9.3 6.0 - 12.0 fL    Platelets 216 140 - 450 10*3/mm3    Neutrophil % 76.4 (H) 42.7 - 76.0 %    Lymphocyte % 11.0 (L) 19.6 - 45.3 %    Monocyte % 11.6 5.0 - 12.0 %    Eosinophil % 0.3 0.3 - 6.2 %    Basophil % 0.5 0.0 - 1.5 %    Immature Grans % 0.2 0.0 - 0.5 %    Neutrophils, Absolute 4.93 1.70 - 7.00 10*3/mm3    Lymphocytes, Absolute 0.71 0.70 - 3.10 10*3/mm3    Monocytes, Absolute 0.75 0.10 - 0.90 10*3/mm3    Eosinophils, Absolute 0.02 0.00 - 0.40 10*3/mm3    Basophils, Absolute 0.03 0.00 - 0.20 10*3/mm3    Immature Grans, Absolute 0.01 0.00 - 0.05 10*3/mm3    nRBC 0.0 0.0 - 0.2 /100 WBC   High Sensitivity Troponin T    Specimen: Blood   Result Value Ref Range    HS Troponin T <6 <10 ng/L   ECG 12 Lead Chest Pain   Result Value Ref Range    QT Interval 354 ms    QTC Interval 421 ms                                          Medical Decision Making  CBC and CMP unremarkable.  Chest x-ray unremarkable.  High-sensitivity troponin unremarkable.  EKG unremarkable.  Decadron given.  Azithromycin will be given for its anti-inflammatory qualities.  High suspicion for viral symptoms secondary to recent COVID-19 diagnosis.  Work up and results were discussed throughly with the patient.  The patient will be discharged for further monitoring and management with their PCP.  Red flags, warning signs, worsening symptoms, and  when to return to the ER discussed with and understood by the patient.  Patient will follow up with their PCP in a timely manner.  Vitals stable at discharge.    Problems Addressed:  COVID-19: complicated acute illness or injury  Upper respiratory infection, viral: complicated acute illness or injury    Amount and/or Complexity of Data Reviewed  Labs: ordered. Decision-making details documented in ED Course.  Radiology: ordered. Decision-making details documented in ED Course.  ECG/medicine tests: ordered.    Risk  OTC drugs.  Prescription drug management.        Final diagnoses:   COVID-19   Upper respiratory infection, viral       ED Disposition  ED Disposition       ED Disposition   Discharge    Condition   Stable    Comment   --               Hakan Jordan PA  [address]  [facility] [address]  [phone]    In 1 week      [facility] EMERGENCY DEPARTMENT  [address] [place] [id number] [phone]    If symptoms worsen         Medication List        New Prescriptions      azithromycin 250 MG tablet  Commonly known as: ZITHROMAX  Take 1 tablet by mouth Daily.     dexAMETHasone 6 MG tablet  Commonly known as: DECADRON  Take 1 tablet by mouth Daily With Breakfast.               Where to Get Your Medications        These medications were sent to [facility] [id number] - [place] - [address] AT [facility] [address] - [phone]  - [phone]   [address] [place] [address]      Phone: [phone]   azithromycin 250 MG tablet  dexAMETHasone 6 MG tablet            William Noriega DO  08/26/23 3822

## 2023-08-26 NOTE — ED NOTES
"Patient reports being at her PCP where she was told to come to the ER due to chest pain accompanied with COVID. Patient reports having a high temperature this morning around 0630 where she took approximately 1,000 mg of tylenol to relieve her \"temperature of 102.6.\"  "

## 2023-08-27 LAB
QT INTERVAL: 354 MS
QTC INTERVAL: 421 MS

## 2023-11-19 ENCOUNTER — APPOINTMENT (OUTPATIENT)
Dept: CT IMAGING | Facility: HOSPITAL | Age: 63
End: 2023-11-19
Payer: COMMERCIAL

## 2023-11-19 ENCOUNTER — APPOINTMENT (OUTPATIENT)
Dept: GENERAL RADIOLOGY | Facility: HOSPITAL | Age: 63
End: 2023-11-19
Payer: COMMERCIAL

## 2023-11-19 ENCOUNTER — HOSPITAL ENCOUNTER (EMERGENCY)
Facility: HOSPITAL | Age: 63
Discharge: HOME | End: 2023-11-19
Attending: STUDENT IN AN ORGANIZED HEALTH CARE EDUCATION/TRAINING PROGRAM | Admitting: STUDENT IN AN ORGANIZED HEALTH CARE EDUCATION/TRAINING PROGRAM
Payer: COMMERCIAL

## 2023-11-19 VITALS
WEIGHT: 150 LBS | TEMPERATURE: 97.1 F | HEART RATE: 87 BPM | DIASTOLIC BLOOD PRESSURE: 86 MMHG | RESPIRATION RATE: 15 BRPM | BODY MASS INDEX: 24.11 KG/M2 | HEIGHT: 66 IN | OXYGEN SATURATION: 93 % | SYSTOLIC BLOOD PRESSURE: 123 MMHG

## 2023-11-19 DIAGNOSIS — R55 SYNCOPE, UNSPECIFIED SYNCOPE TYPE: ICD-10-CM

## 2023-11-19 DIAGNOSIS — E86.0 DEHYDRATION: ICD-10-CM

## 2023-11-19 DIAGNOSIS — I48.91 ATRIAL FIBRILLATION WITH RAPID VENTRICULAR RESPONSE: Primary | ICD-10-CM

## 2023-11-19 DIAGNOSIS — R33.9 URINARY RETENTION: ICD-10-CM

## 2023-11-19 DIAGNOSIS — Z97.8 FOLEY CATHETER IN PLACE: ICD-10-CM

## 2023-11-19 LAB
ANION GAP SERPL CALCULATED.3IONS-SCNC: 14.3 MMOL/L (ref 5–15)
BACTERIA UR QL AUTO: ABNORMAL /HPF
BASOPHILS # BLD AUTO: 0.04 10*3/MM3 (ref 0–0.2)
BASOPHILS NFR BLD AUTO: 0.5 % (ref 0–1.5)
BILIRUB UR QL STRIP: NEGATIVE
BUN SERPL-MCNC: 13 MG/DL (ref 8–23)
BUN/CREAT SERPL: 18.3 (ref 7–25)
CALCIUM SPEC-SCNC: 9.3 MG/DL (ref 8.6–10.5)
CHLORIDE SERPL-SCNC: 99 MMOL/L (ref 98–107)
CLARITY UR: CLEAR
CO2 SERPL-SCNC: 23.7 MMOL/L (ref 22–29)
COLOR UR: YELLOW
CREAT SERPL-MCNC: 0.71 MG/DL (ref 0.57–1)
D-LACTATE SERPL-SCNC: 2 MMOL/L (ref 0.5–2)
DEPRECATED RDW RBC AUTO: 38.3 FL (ref 37–54)
EGFRCR SERPLBLD CKD-EPI 2021: 96.3 ML/MIN/1.73
EOSINOPHIL # BLD AUTO: 0.07 10*3/MM3 (ref 0–0.4)
EOSINOPHIL NFR BLD AUTO: 0.9 % (ref 0.3–6.2)
ERYTHROCYTE [DISTWIDTH] IN BLOOD BY AUTOMATED COUNT: 11.9 % (ref 12.3–15.4)
GLUCOSE SERPL-MCNC: 131 MG/DL (ref 65–99)
GLUCOSE UR STRIP-MCNC: NEGATIVE MG/DL
HCT VFR BLD AUTO: 42.8 % (ref 34–46.6)
HGB BLD-MCNC: 14.7 G/DL (ref 12–15.9)
HGB UR QL STRIP.AUTO: NEGATIVE
HOLD SPECIMEN: NORMAL
HOLD SPECIMEN: NORMAL
HYALINE CASTS UR QL AUTO: ABNORMAL /LPF
IMM GRANULOCYTES # BLD AUTO: 0.02 10*3/MM3 (ref 0–0.05)
IMM GRANULOCYTES NFR BLD AUTO: 0.3 % (ref 0–0.5)
KETONES UR QL STRIP: NEGATIVE
LEUKOCYTE ESTERASE UR QL STRIP.AUTO: ABNORMAL
LYMPHOCYTES # BLD AUTO: 1.5 10*3/MM3 (ref 0.7–3.1)
LYMPHOCYTES NFR BLD AUTO: 19.6 % (ref 19.6–45.3)
MAGNESIUM SERPL-MCNC: 1.7 MG/DL (ref 1.6–2.4)
MCH RBC QN AUTO: 30.2 PG (ref 26.6–33)
MCHC RBC AUTO-ENTMCNC: 34.3 G/DL (ref 31.5–35.7)
MCV RBC AUTO: 88.1 FL (ref 79–97)
MONOCYTES # BLD AUTO: 0.65 10*3/MM3 (ref 0.1–0.9)
MONOCYTES NFR BLD AUTO: 8.5 % (ref 5–12)
NEUTROPHILS NFR BLD AUTO: 5.39 10*3/MM3 (ref 1.7–7)
NEUTROPHILS NFR BLD AUTO: 70.2 % (ref 42.7–76)
NITRITE UR QL STRIP: NEGATIVE
NRBC BLD AUTO-RTO: 0 /100 WBC (ref 0–0.2)
PH UR STRIP.AUTO: 8.5 [PH] (ref 5–8)
PLATELET # BLD AUTO: 294 10*3/MM3 (ref 140–450)
PMV BLD AUTO: 9.9 FL (ref 6–12)
POTASSIUM SERPL-SCNC: 2.8 MMOL/L (ref 3.5–5.2)
PROT UR QL STRIP: NEGATIVE
QT INTERVAL: 352 MS
QTC INTERVAL: 493 MS
RBC # BLD AUTO: 4.86 10*6/MM3 (ref 3.77–5.28)
RBC # UR STRIP: ABNORMAL /HPF
REF LAB TEST METHOD: ABNORMAL
SODIUM SERPL-SCNC: 137 MMOL/L (ref 136–145)
SP GR UR STRIP: 1.01 (ref 1–1.03)
SQUAMOUS #/AREA URNS HPF: ABNORMAL /HPF
TROPONIN T SERPL HS-MCNC: 9 NG/L
UROBILINOGEN UR QL STRIP: ABNORMAL
WBC # UR STRIP: ABNORMAL /HPF
WBC NRBC COR # BLD AUTO: 7.67 10*3/MM3 (ref 3.4–10.8)
WHOLE BLOOD HOLD COAG: NORMAL
WHOLE BLOOD HOLD SPECIMEN: NORMAL

## 2023-11-19 PROCEDURE — 80048 BASIC METABOLIC PNL TOTAL CA: CPT | Performed by: STUDENT IN AN ORGANIZED HEALTH CARE EDUCATION/TRAINING PROGRAM

## 2023-11-19 PROCEDURE — 74177 CT ABD & PELVIS W/CONTRAST: CPT

## 2023-11-19 PROCEDURE — 99285 EMERGENCY DEPT VISIT HI MDM: CPT

## 2023-11-19 PROCEDURE — 96374 THER/PROPH/DIAG INJ IV PUSH: CPT

## 2023-11-19 PROCEDURE — 51702 INSERT TEMP BLADDER CATH: CPT

## 2023-11-19 PROCEDURE — 83605 ASSAY OF LACTIC ACID: CPT | Performed by: STUDENT IN AN ORGANIZED HEALTH CARE EDUCATION/TRAINING PROGRAM

## 2023-11-19 PROCEDURE — 93005 ELECTROCARDIOGRAM TRACING: CPT | Performed by: STUDENT IN AN ORGANIZED HEALTH CARE EDUCATION/TRAINING PROGRAM

## 2023-11-19 PROCEDURE — 83735 ASSAY OF MAGNESIUM: CPT | Performed by: STUDENT IN AN ORGANIZED HEALTH CARE EDUCATION/TRAINING PROGRAM

## 2023-11-19 PROCEDURE — 25510000001 IOPAMIDOL 61 % SOLUTION: Performed by: STUDENT IN AN ORGANIZED HEALTH CARE EDUCATION/TRAINING PROGRAM

## 2023-11-19 PROCEDURE — 25810000003 SODIUM CHLORIDE 0.9 % SOLUTION: Performed by: STUDENT IN AN ORGANIZED HEALTH CARE EDUCATION/TRAINING PROGRAM

## 2023-11-19 PROCEDURE — 81001 URINALYSIS AUTO W/SCOPE: CPT | Performed by: STUDENT IN AN ORGANIZED HEALTH CARE EDUCATION/TRAINING PROGRAM

## 2023-11-19 PROCEDURE — 96375 TX/PRO/DX INJ NEW DRUG ADDON: CPT

## 2023-11-19 PROCEDURE — 85025 COMPLETE CBC W/AUTO DIFF WBC: CPT | Performed by: STUDENT IN AN ORGANIZED HEALTH CARE EDUCATION/TRAINING PROGRAM

## 2023-11-19 PROCEDURE — 51798 US URINE CAPACITY MEASURE: CPT

## 2023-11-19 PROCEDURE — 84484 ASSAY OF TROPONIN QUANT: CPT | Performed by: STUDENT IN AN ORGANIZED HEALTH CARE EDUCATION/TRAINING PROGRAM

## 2023-11-19 RX ORDER — POTASSIUM CHLORIDE 20 MEQ/1
40 TABLET, EXTENDED RELEASE ORAL ONCE
Status: COMPLETED | OUTPATIENT
Start: 2023-11-19 | End: 2023-11-19

## 2023-11-19 RX ORDER — METOPROLOL TARTRATE 5 MG/5ML
5 INJECTION INTRAVENOUS ONCE
Status: DISCONTINUED | OUTPATIENT
Start: 2023-11-19 | End: 2023-11-19

## 2023-11-19 RX ORDER — SODIUM CHLORIDE 0.9 % (FLUSH) 0.9 %
10 SYRINGE (ML) INJECTION AS NEEDED
Status: DISCONTINUED | OUTPATIENT
Start: 2023-11-19 | End: 2023-11-19 | Stop reason: HOSPADM

## 2023-11-19 RX ORDER — METOPROLOL TARTRATE 5 MG/5ML
5 INJECTION INTRAVENOUS ONCE
Status: COMPLETED | OUTPATIENT
Start: 2023-11-19 | End: 2023-11-19

## 2023-11-19 RX ORDER — RIVAROXABAN 15 MG-20MG
KIT ORAL
Qty: 1 EACH | Refills: 0 | Status: SHIPPED | OUTPATIENT
Start: 2023-11-19 | End: 2023-11-27 | Stop reason: SDUPTHER

## 2023-11-19 RX ORDER — DILTIAZEM HYDROCHLORIDE 5 MG/ML
10 INJECTION INTRAVENOUS ONCE
Status: COMPLETED | OUTPATIENT
Start: 2023-11-19 | End: 2023-11-19

## 2023-11-19 RX ADMIN — POTASSIUM CHLORIDE 40 MEQ: 1500 TABLET, EXTENDED RELEASE ORAL at 16:43

## 2023-11-19 RX ADMIN — SODIUM CHLORIDE 1000 ML: 9 INJECTION, SOLUTION INTRAVENOUS at 18:00

## 2023-11-19 RX ADMIN — DILTIAZEM HYDROCHLORIDE 10 MG: 5 INJECTION INTRAVENOUS at 16:44

## 2023-11-19 RX ADMIN — IOPAMIDOL 70 ML: 612 INJECTION, SOLUTION INTRAVENOUS at 17:25

## 2023-11-19 RX ADMIN — SODIUM CHLORIDE 1000 ML: 9 INJECTION, SOLUTION INTRAVENOUS at 15:29

## 2023-11-19 RX ADMIN — METOPROLOL TARTRATE 5 MG: 1 INJECTION, SOLUTION INTRAVENOUS at 19:34

## 2023-11-19 NOTE — Clinical Note
[facility] EMERGENCY DEPARTMENT  [address] [facility] [address]  Phone: [phone]    Annmarie Hopkins was seen and treated in our emergency department on 11/19/2023.  She may return to work on 11/23/2023.         Thank you for choosing [facility].    Kourtney Massey MD

## 2023-11-19 NOTE — Clinical Note
[facility] EMERGENCY DEPARTMENT  [address] [facility] [address]  Phone: [phone]    Annmarie Hopkins was seen and treated in our emergency department on 11/19/2023.  She may return to work on 11/23/2023.         Thank you for choosing [facility].    Kourtney Massey MD

## 2023-11-19 NOTE — ED PROVIDER NOTES
Subjective   History of Present Illness    63-year-old female with past medical history of hypothyroid, arthritis presenting for syncope.  Patient states that she was working a lot of hours yesterday and has not been drinking water.  Patient states she feels that she is dehydrated.  Patient states she remembers going to stand up and feeling very lightheaded.  Patient was then found by  with her head and in trash can.  Patient regained consciousness but when  tried to stand her up again she passed out again.  Patient then was laid on the ground.  When  tried to sit her up she again got lightheaded so left but did not pass out so left her flat.  Patient denies any headache.  Reports nausea but states that this was present prior to losing consciousness.  Denies any vomiting.  Does report suprapubic pressure and difficulty urinating states this is happened before several years ago when she had a urinary tract infection.  Denies any fevers. Denies chest pain, shortness of breath. Denies any injury from fall.     Review of Systems    Past Medical History:   Diagnosis Date    Arthritis     Hypothyroid        Allergies   Allergen Reactions    Codeine Unknown - Low Severity       Past Surgical History:   Procedure Laterality Date    LAPAROSCOPIC TUBAL LIGATION      THYROIDECTOMY  2014       Family History   Problem Relation Age of Onset    Cancer Mother     Hypertension Father     Heart disease Father     Diabetes Father     Heart disease Paternal Grandfather     Breast cancer Paternal Cousin        Social History     Socioeconomic History    Marital status:    Tobacco Use    Smoking status: Never           Objective   Physical Exam  Vitals and nursing note reviewed.   Constitutional:       General: She is not in acute distress.  HENT:      Head: Normocephalic and atraumatic.      Mouth/Throat:      Mouth: Mucous membranes are dry.      Pharynx: Oropharynx is clear.   Eyes:      Extraocular  Movements: Extraocular movements intact.      Conjunctiva/sclera: Conjunctivae normal.      Pupils: Pupils are equal, round, and reactive to light.   Cardiovascular:      Rate and Rhythm: Regular rhythm. Tachycardia present.      Pulses: Normal pulses.   Pulmonary:      Effort: Pulmonary effort is normal.      Breath sounds: Normal breath sounds.   Abdominal:      General: Abdomen is flat. There is no distension.      Palpations: Abdomen is soft.      Tenderness: There is abdominal tenderness (mild suprapubic).   Musculoskeletal:         General: No swelling, tenderness, deformity or signs of injury. Normal range of motion.      Cervical back: Normal range of motion and neck supple.   Skin:     General: Skin is warm and dry.   Neurological:      General: No focal deficit present.      Mental Status: She is alert and oriented to person, place, and time.      Sensory: No sensory deficit.      Motor: No weakness.         Procedures           ED Course  ED Course as of 11/19/23 1950   Sun Nov 19, 2023   1529 Patient has had approximately 800 cc of urine since presenting to the emergency department.  On bladder scan continues to have 400 cc but is actively urinating.  We will continue to monitor. [KH]   1617   EKG ED Interpretation by: Kourtney Massey MD on 11/19/23 at 14:33  EKG: , atrial fibrillation with rapid ventricular response, no history A-fib, ST depressions inferior leads, no ST elevations.     Electronically signed by Kourtney Massey MD, 11/19/23, 4:18 PM EST.   [KH]   1620 New onset A-fib.  Patient heart rate not significantly improved with IV fluids. Will give dose of Cardizem.  No chest pain.  Troponin negative.    Patient's potassium also noted to be low.  Oral repletion ordered.  Will check magnesium level.    Urinalysis does not appear consistent with UTI. [KH]   5394 CT scan without acute pathology, personally reviewed CT images.  No significant constipation but does have notable distention of the  bladder.  Suspect likely some degree of chronic urinary retention with acute worsening.  Placing Shaikh catheter.  Patient will need to discharge with catheter in place and urology follow-up.    Patient will also require PCP versus cardiology follow-up for new onset A-fib. []   1928 Discussed stroke risk related to A-fib with patient as well as recommendation for anticoagulation.  Sending prescription for Xarelto to the pharmacy. [KH]      ED Course User Index  [KH] Kourtney Massey MD                                   [facility] reviewed by Kourtney Massey MD       Medical Decision Making  Problems Addressed:  Atrial fibrillation with rapid ventricular response: complicated acute illness or injury  Dehydration: complicated acute illness or injury  Shaikh catheter in place: complicated acute illness or injury  Syncope, unspecified syncope type: complicated acute illness or injury  Urinary retention: complicated acute illness or injury    Amount and/or Complexity of Data Reviewed  Labs: ordered.  Radiology: ordered.  ECG/medicine tests: ordered.    Risk  Prescription drug management.      Patient appears clinically rather dehydrated.  IV fluids ordered.  Suspect likely orthostatic hypotension     CBC, BMP, lactate, troponin, urinalysis  CT head imaging not indicated per [place] CT head criteria    Bladder scan to eval for urinary retention    Final diagnoses:   Atrial fibrillation with rapid ventricular response   Urinary retention   Shaikh catheter in place   Syncope, unspecified syncope type   Dehydration       ED Disposition  ED Disposition       ED Disposition   Discharge    Condition   Stable    Comment   --               Hakan Jordan PA  [address] [facility] [address]  [phone]    Schedule an appointment as soon as possible for a visit       Shane Manning MD  [address] [facility]  [address]  [facility] [address]  [phone]      urologist. for your urine catheter         Medication List        New  Prescriptions      Xarelto Starter Pack tablet therapy pack starter pack  Generic drug: Rivaroxaban  Take one 15 mg tablet twice daily with food for 21 days.  Followed by one 20 mg tablet by mouth once daily with food. Take as directed               Where to Get Your Medications        These medications were sent to [facility] [id number] - [place] - [address] [facility] [address] - [phone]  - [phone]   [address] [place] [address]      Phone: [phone]   Xarelto Starter Pack tablet therapy pack starter pack            Kourtney Massey MD  11/19/23 1950

## 2023-11-20 ENCOUNTER — OFFICE VISIT (OUTPATIENT)
Dept: UROLOGY | Facility: CLINIC | Age: 63
End: 2023-11-20
Payer: COMMERCIAL

## 2023-11-20 VITALS
BODY MASS INDEX: 24.4 KG/M2 | DIASTOLIC BLOOD PRESSURE: 78 MMHG | HEIGHT: 66 IN | WEIGHT: 151.8 LBS | HEART RATE: 76 BPM | SYSTOLIC BLOOD PRESSURE: 157 MMHG

## 2023-11-20 DIAGNOSIS — N34.3 DYSURIA-FREQUENCY SYNDROME: ICD-10-CM

## 2023-11-20 DIAGNOSIS — N32.89 BLADDER SPASMS: ICD-10-CM

## 2023-11-20 DIAGNOSIS — R33.9 RETENTION OF URINE: Primary | ICD-10-CM

## 2023-11-20 PROCEDURE — 99204 OFFICE O/P NEW MOD 45 MIN: CPT | Performed by: NURSE PRACTITIONER

## 2023-11-20 RX ORDER — TAMSULOSIN HYDROCHLORIDE 0.4 MG/1
1 CAPSULE ORAL DAILY
Qty: 30 CAPSULE | Refills: 1 | Status: SHIPPED | OUTPATIENT
Start: 2023-11-20 | End: 2023-11-20 | Stop reason: SDUPTHER

## 2023-11-20 RX ORDER — TAMSULOSIN HYDROCHLORIDE 0.4 MG/1
1 CAPSULE ORAL DAILY
Qty: 90 CAPSULE | Refills: 3 | Status: SHIPPED | OUTPATIENT
Start: 2023-11-20 | End: 2023-11-20 | Stop reason: SDUPTHER

## 2023-11-20 RX ORDER — FINASTERIDE 5 MG/1
5 TABLET, FILM COATED ORAL DAILY
Qty: 90 TABLET | Refills: 3 | Status: SHIPPED | OUTPATIENT
Start: 2023-11-20 | End: 2023-11-20 | Stop reason: ALTCHOICE

## 2023-11-20 RX ORDER — ALENDRONATE SODIUM 70 MG/1
70 TABLET ORAL
COMMUNITY
Start: 2023-10-12

## 2023-11-20 RX ORDER — PHENAZOPYRIDINE HYDROCHLORIDE 200 MG/1
200 TABLET, FILM COATED ORAL 3 TIMES DAILY PRN
Qty: 20 TABLET | Refills: 0 | Status: SHIPPED | OUTPATIENT
Start: 2023-11-20 | End: 2023-11-27 | Stop reason: ALTCHOICE

## 2023-11-20 RX ORDER — TAMSULOSIN HYDROCHLORIDE 0.4 MG/1
1 CAPSULE ORAL DAILY
Qty: 30 CAPSULE | Refills: 1 | Status: SHIPPED | OUTPATIENT
Start: 2023-11-20

## 2023-11-20 NOTE — PROGRESS NOTES
Chief Complaint  Urinary Retention (New patient WITH URINE RETENTION/DYSURIA/FREQUENCY WITH BERGER CATHETER)    Subjective          Annmarie Hopkins presents to [facility] for URINE RETENTION  Urinary Retention  Associated symptoms include abdominal pain, fatigue and myalgias. Pertinent negatives include no arthralgias, chest pain, chills, congestion, coughing, diaphoresis, fever, joint swelling, nausea, rash, sore throat, vomiting or weakness.     Mrs. Annmarie Hopkins is a pleasant 63-year-old female who presents to clinic today for evaluation. Patient has been referred to clinic by PCP for urinary retention and dysuria. The patient is accompanied by her  Mr. Tomi Hopkins who is also involved in the patient's care and contributes significantly to her history today.     Apparently, she presented to [facility] ED yesterday, 11/19/2023 secondary to syncope episode. During ER evaluation, she was noted to have difficulty urinating. On clinic evaluation, today, patient reports she has an indwelling Berger catheter that was placed yesterday.   When Berger catheter was placed initially it drained greater than 800 cc of urine. The patient's bladder scan showed 400 residual post Berger catheter draining .     She had a CT abdomen and pelvis also completed in the ER which was unremarkable with results of her urinary retention not noted on CT. I have reviewed the imaging showing no pelvic mass or lymphadenopathy noted. There is some calcifications within the uterus likely related to fibroids. The adrenal glands are all normal. She has an extrarenal pelvis present bilaterally but no evidence of hydronephrosis. No bladder stones or tumors noted. The urinalysis obtained in the ER yesterday was unremarkable with no bacteria seen noted. Pending urine culture.    The patient reports a prior to ER visit, she woke up with cramps in her bilateral thighs and calves. Her   "states it took approximately 1 minute for her to respond after her syncopal episode. She denies any headaches or blurred vision. She has always had an overactive bladder. She denies taking any medication for her bowels and bladder. She urinated when she came home from the hospital. She had a urinary tract infection approximately 4 years ago. She denies being informed she has a stricture. Her urine stream is \"a spray.\"    We discussed starting tamsulosin 0.4 mg daily, she denies taking any medication for her blood pressure that might be affected by her Flomax. She was informed she was in atrial fibrillation, and she is taking Xarelto. She has not had a bowel movement in a few days. She does not eat regular meals. She likes prunes. She has had 2 colonoscopies in the past, and she had polyps with her first one.     She does not smoke, denies any recent groin trauma, denies any recent bladder surgeries. SHE denies any family history of bladder cancer, uterine or breast cancer. Her mother had 1 kidney and lost her kidney in her 20s. She denies any hematuria. She denies any kidney stones. She denies smoking.    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 Negative for appetite change, chills, diaphoresis, fever, unexpected weight gain and unexpected weight loss.   HENT: Negative.  Negative for congestion, ear discharge, ear pain, nosebleeds, rhinorrhea, sinus pressure and sore throat.    Eyes: Negative.  Negative for blurred vision, " double vision, photophobia, pain, redness and visual disturbance.   Respiratory: Negative.  Negative for apnea, cough, chest tightness, shortness of breath, wheezing and stridor.    Cardiovascular: Negative.  Negative for chest pain and palpitations.   Gastrointestinal:  Positive for abdominal distention, abdominal pain and constipation. Negative for diarrhea, nausea and vomiting.   Endocrine: Negative.  Negative for polydipsia, polyphagia and polyuria.   Genitourinary:  Positive for difficulty urinating, dysuria, flank pain, frequency, pelvic pain, pelvic pressure and urgency. Negative for decreased urine volume, dyspareunia, genital sores, hematuria, urinary incontinence and vaginal discharge.   Musculoskeletal:  Positive for back pain and myalgias. Negative for arthralgias and joint swelling.   Skin:  Positive for color change and dry skin. Negative for pallor, rash and wound.   Allergic/Immunologic: Negative.    Neurological: Negative.  Negative for dizziness, tremors, syncope, weakness, light-headedness, headache, memory problem and confusion.   Hematological: Negative.    Psychiatric/Behavioral:  Positive for sleep disturbance and stress. Negative for behavioral problems and decreased concentration.         Physical Exam  Constitutional:       General: She is in acute distress.      Appearance: She is well-developed. She is ill-appearing.   HENT:      Head: Normocephalic and atraumatic.   Eyes:      Pupils: Pupils are equal, round, and reactive to light.   Neck:      Thyroid: No thyromegaly.      Trachea: No tracheal deviation.   Cardiovascular:      Rate and Rhythm: Normal rate and regular rhythm.      Heart sounds: No murmur heard.  Pulmonary:      Effort: Pulmonary effort is normal. No respiratory distress.      Breath sounds: Normal breath sounds. No stridor. No wheezing.   Abdominal:      General: Bowel sounds are normal. There is distension.      Palpations: Abdomen is soft.      Tenderness: There is  abdominal tenderness.   Genitourinary:     Labia:         Right: No tenderness.         Left: No tenderness.       Vagina: Normal. No vaginal discharge.      Comments: DYSURIA WITH INDWELLING BERGER CATH  Musculoskeletal:         General: Tenderness present. No deformity. Normal range of motion.      Cervical back: Normal range of motion.   Skin:     General: Skin is warm and dry.      Capillary Refill: Capillary refill takes less than 2 seconds.      Coloration: Skin is pale.      Findings: No erythema or rash.   Neurological:      Mental Status: She is alert and oriented to person, place, and time.      Cranial Nerves: No cranial nerve deficit.      Sensory: No sensory deficit.      Motor: Weakness present.      Coordination: Coordination normal.   Psychiatric:         Behavior: Behavior normal.         Thought Content: Thought content normal.         Judgment: Judgment normal.        Result Review :     UA          11/19/2023    15:29   Urinalysis   Squamous Epithelial Cells, UA None Seen    Specific Gravity, UA 1.007    Ketones, UA Negative    Blood, UA Negative    Leukocytes, UA Small (1+)    Nitrite, UA Negative    RBC, UA 0-2    WBC, UA 3-5    Bacteria, UA None Seen               Assessment and Plan    Problem List Items Addressed This Visit          Genitourinary and Reproductive     Dysuria-frequency syndrome    Relevant Medications    phenazopyridine (Pyridium) 200 MG tablet    tamsulosin (FLOMAX) 0.4 MG capsule 24 hr capsule    Bladder spasms    Relevant Medications    phenazopyridine (Pyridium) 200 MG tablet    tamsulosin (FLOMAX) 0.4 MG capsule 24 hr capsule    Retention of urine - Primary    Overview     The patient will start Flomax  She will follow up in 2 weeks for a voiding trial.  If she is still having symptoms in 2 weeks, we will schedule a cystoscopy.         Relevant Medications    phenazopyridine (Pyridium) 200 MG tablet    tamsulosin (FLOMAX) 0.4 MG capsule 24 hr capsule      ASSESSMENT         URINE RETENTION/DYSURIA-FREQUENCY SYNDROME  Mrs. Annmarie HONEYCUTT is a pleasant 63-year-old female who presents to clinic today for evaluation with concerns of dysuria, requiring an indwelling Shaikh catheter placed yesterday.  Patient developed urinary retention and a syncope episode requiring ER evaluation.  She denies any urinary symptoms prior to episode.  SHe denies having any groin trauma, nevertheless we discussed the different causes of painful urination such as infection, inflammation, dietary factors, or problems with her bladder, versus kidney stones.    We discussed inflammation that can be caused by irritation to the skin, or dysuria caused by and underlying bladder conditions.We also discussed urinary tract infections which can be easily treated with antibiotics.  However her urine dipstick is completely negative for any infection today.     We discussed anatomic obstructions or malformations due to ureteral stricture, pain due to external lesions on the genitalia, with urine touching this lesions causing pain.  Discussed external irritation reaction from frequent douching, or application of irritating/allergenic products to the vaginal area.  Patient denies utilizing any of these products.    IBS- Patient has significant irritable bowel syndrome, characterized by extreme amounts of constipation.  We spoke about the impact of this on bladder function.  We spoke about  its relationship to recurrent urinary tract infections.  We discussed the need for increasing p.o. fluid intake to at least 2 to 3 L of water daily, and discussed the physiology of colonic motility as well as use of MiraLAX as a bulk laxative versus the newer class of serotonin uptake blockers such as Linzess.  We stressed the need for a daily bowel movement and discussed the [place] stool scale at length.We also discussed a referral to the GI nurse practitioner                           PLAN  Patient started on tamsulosin 0.4 mg  today.    Shaikh catheter was discontinued in clinic today per patient's request.   I Prepped and Draped the patient in a sterile fashion,  Removed the existing  20 FR  catheter after deflating the 10 cc filled balloon.  Patient reported relief and was able to urinate prior to leaving clinic.    We discussed intermittent catheterization, patient/ unable to read demonstrate technique with sample catheters given for self cath in case of difficulty urinating.    We will send her urine out for culture, I will call her with results if any positive bacterial growth.    We discussed several things She can do to reduce the discomfort of painful urination/straining on urination such as increasing his p.o. fluid intake with water, and avoiding bladder irritants such as caffeine products, cola, soda drinks, and nicotine, also avoiding issues with constipation, as it puts pressure on the bladder.     She is agreeable to watching HER diet to prevent  any bladder irritants such as citrus, caffeine, cola, soda drinks    She will continue the Flomax to see if it helps HER symptoms.    Also recommend bowel regimen due to IBS with constipation.  Patient recommended daily MiraLAX, and prune juice/prunes as tolerated    We discussed lower tract investigation, via cystoscopy with Dr. Manning-deferred at this time per patient's request.  Wants to try medications first     Will see her back for follow-up in 2 weeks for bladder rescan.    She may return sooner sooner if symptoms worsen     Patient/Spouse is agreeable with plan of care    Patient reports that she is not currently experiencing any symptoms of urinary incontinence.    BMI is within normal parameters. No other follow-up for BMI required.    RADIOLOGY (CT AND/OR KUB):    CT Abdomen and Pelvis: No results found for this or any previous visit.     CT Stone Protocol: No results found for this or any previous visit.     KUB: No results found for this or any previous visit.        LABS (3 MONTHS):    Admission on 11/19/2023, Discharged on 11/19/2023   Component Date Value Ref Range Status    QT Interval 11/19/2023 352  ms Final    QTC Interval 11/19/2023 493  ms Final    Glucose 11/19/2023 131 (H)  65 - 99 mg/dL Final    BUN 11/19/2023 13  8 - 23 mg/dL Final    Creatinine 11/19/2023 0.71  0.57 - 1.00 mg/dL Final    Sodium 11/19/2023 137  136 - 145 mmol/L Final    Potassium 11/19/2023 2.8 (L)  3.5 - 5.2 mmol/L Final    Chloride 11/19/2023 99  98 - 107 mmol/L Final    CO2 11/19/2023 23.7  22.0 - 29.0 mmol/L Final    Calcium 11/19/2023 9.3  8.6 - 10.5 mg/dL Final    BUN/Creatinine Ratio 11/19/2023 18.3  7.0 - 25.0 Final    Anion Gap 11/19/2023 14.3  5.0 - 15.0 mmol/L Final    eGFR 11/19/2023 96.3  >60.0 mL/min/1.73 Final    HS Troponin T 11/19/2023 9  <14 ng/L Final    WBC 11/19/2023 7.67  3.40 - 10.80 10*3/mm3 Final    RBC 11/19/2023 4.86  3.77 - 5.28 10*6/mm3 Final    Hemoglobin 11/19/2023 14.7  12.0 - 15.9 g/dL Final    Hematocrit 11/19/2023 42.8  34.0 - 46.6 % Final    MCV 11/19/2023 88.1  79.0 - 97.0 fL Final    MCH 11/19/2023 30.2  26.6 - 33.0 pg Final    MCHC 11/19/2023 34.3  31.5 - 35.7 g/dL Final    RDW 11/19/2023 11.9 (L)  12.3 - 15.4 % Final    RDW-SD 11/19/2023 38.3  37.0 - 54.0 fl Final    MPV 11/19/2023 9.9  6.0 - 12.0 fL Final    Platelets 11/19/2023 294  140 - 450 10*3/mm3 Final    Neutrophil % 11/19/2023 70.2  42.7 - 76.0 % Final    Lymphocyte % 11/19/2023 19.6  19.6 - 45.3 % Final    Monocyte % 11/19/2023 8.5  5.0 - 12.0 % Final    Eosinophil % 11/19/2023 0.9  0.3 - 6.2 % Final    Basophil % 11/19/2023 0.5  0.0 - 1.5 % Final    Immature Grans % 11/19/2023 0.3  0.0 - 0.5 % Final    Neutrophils, Absolute 11/19/2023 5.39  1.70 - 7.00 10*3/mm3 Final    Lymphocytes, Absolute 11/19/2023 1.50  0.70 - 3.10 10*3/mm3 Final    Monocytes, Absolute 11/19/2023 0.65  0.10 - 0.90 10*3/mm3 Final    Eosinophils, Absolute 11/19/2023 0.07  0.00 - 0.40 10*3/mm3 Final    Basophils, Absolute  11/19/2023 0.04  0.00 - 0.20 10*3/mm3 Final    Immature Grans, Absolute 11/19/2023 0.02  0.00 - 0.05 10*3/mm3 Final    nRBC 11/19/2023 0.0  0.0 - 0.2 /100 WBC Final    Lactate 11/19/2023 2.0  0.5 - 2.0 mmol/L Final    Extra Tube 11/19/2023 Hold for add-ons.   Final    Auto resulted.    Extra Tube 11/19/2023 hold for add-on   Final    Auto resulted    Extra Tube 11/19/2023 Hold for add-ons.   Final    Auto resulted.    Extra Tube 11/19/2023 Hold for add-ons.   Final    Auto resulted    Color, UA 11/19/2023 Yellow  Yellow, Straw Final    Appearance, UA 11/19/2023 Clear  Clear Final    pH, UA 11/19/2023 8.5 (H)  5.0 - 8.0 Final    Specific Gravity, UA 11/19/2023 1.007  1.005 - 1.030 Final    Glucose, UA 11/19/2023 Negative  Negative Final    Ketones, UA 11/19/2023 Negative  Negative Final    Bilirubin, UA 11/19/2023 Negative  Negative Final    Blood, UA 11/19/2023 Negative  Negative Final    Protein, UA 11/19/2023 Negative  Negative Final    Leuk Esterase, UA 11/19/2023 Small (1+) (A)  Negative Final    Nitrite, UA 11/19/2023 Negative  Negative Final    Urobilinogen, UA 11/19/2023 0.2 E.U./dL  0.2 - 1.0 E.U./dL Final    RBC, UA 11/19/2023 0-2  None Seen, 0-2 /HPF Final    WBC, UA 11/19/2023 3-5 (A)  None Seen, 0-2 /HPF Final    Urine culture not indicated.    Bacteria, UA 11/19/2023 None Seen  None Seen /HPF Final    Squamous Epithelial Cells, UA 11/19/2023 None Seen  None Seen, 0-2 /HPF Final    Hyaline Casts, UA 11/19/2023 None Seen  None Seen /LPF Final    Methodology 11/19/2023 Automated Microscopy   Final    Magnesium 11/19/2023 1.7  1.6 - 2.4 mg/dL Final   Admission on 08/26/2023, Discharged on 08/26/2023   Component Date Value Ref Range Status    Glucose 08/26/2023 122 (H)  65 - 99 mg/dL Final    BUN 08/26/2023 7 (L)  8 - 23 mg/dL Final    Creatinine 08/26/2023 0.80  0.57 - 1.00 mg/dL Final    Sodium 08/26/2023 139  136 - 145 mmol/L Final    Potassium 08/26/2023 3.4 (L)  3.5 - 5.2 mmol/L Final    Chloride  08/26/2023 102  98 - 107 mmol/L Final    CO2 08/26/2023 22.7  22.0 - 29.0 mmol/L Final    Calcium 08/26/2023 9.5  8.6 - 10.5 mg/dL Final    Total Protein 08/26/2023 7.0  6.0 - 8.5 g/dL Final    Albumin 08/26/2023 4.3  3.5 - 5.2 g/dL Final    ALT (SGPT) 08/26/2023 16  1 - 33 U/L Final    AST (SGOT) 08/26/2023 16  1 - 32 U/L Final    Alkaline Phosphatase 08/26/2023 103  39 - 117 U/L Final    Total Bilirubin 08/26/2023 0.6  0.0 - 1.2 mg/dL Final    Globulin 08/26/2023 2.7  gm/dL Final    A/G Ratio 08/26/2023 1.6  g/dL Final    BUN/Creatinine Ratio 08/26/2023 8.8  7.0 - 25.0 Final    Anion Gap 08/26/2023 14.3  5.0 - 15.0 mmol/L Final    eGFR 08/26/2023 83.4  >60.0 mL/min/1.73 Final    WBC 08/26/2023 6.45  3.40 - 10.80 10*3/mm3 Final    RBC 08/26/2023 4.90  3.77 - 5.28 10*6/mm3 Final    Hemoglobin 08/26/2023 14.8  12.0 - 15.9 g/dL Final    Hematocrit 08/26/2023 44.3  34.0 - 46.6 % Final    MCV 08/26/2023 90.4  79.0 - 97.0 fL Final    MCH 08/26/2023 30.2  26.6 - 33.0 pg Final    MCHC 08/26/2023 33.4  31.5 - 35.7 g/dL Final    RDW 08/26/2023 12.1 (L)  12.3 - 15.4 % Final    RDW-SD 08/26/2023 40.2  37.0 - 54.0 fl Final    MPV 08/26/2023 9.3  6.0 - 12.0 fL Final    Platelets 08/26/2023 216  140 - 450 10*3/mm3 Final    Neutrophil % 08/26/2023 76.4 (H)  42.7 - 76.0 % Final    Lymphocyte % 08/26/2023 11.0 (L)  19.6 - 45.3 % Final    Monocyte % 08/26/2023 11.6  5.0 - 12.0 % Final    Eosinophil % 08/26/2023 0.3  0.3 - 6.2 % Final    Basophil % 08/26/2023 0.5  0.0 - 1.5 % Final    Immature Grans % 08/26/2023 0.2  0.0 - 0.5 % Final    Neutrophils, Absolute 08/26/2023 4.93  1.70 - 7.00 10*3/mm3 Final    Lymphocytes, Absolute 08/26/2023 0.71  0.70 - 3.10 10*3/mm3 Final    Monocytes, Absolute 08/26/2023 0.75  0.10 - 0.90 10*3/mm3 Final    Eosinophils, Absolute 08/26/2023 0.02  0.00 - 0.40 10*3/mm3 Final    Basophils, Absolute 08/26/2023 0.03  0.00 - 0.20 10*3/mm3 Final    Immature Grans, Absolute 08/26/2023 0.01  0.00 - 0.05 10*3/mm3  Final    nRBC 08/26/2023 0.0  0.0 - 0.2 /100 WBC Final    QT Interval 08/26/2023 354  ms Final    QTC Interval 08/26/2023 421  ms Final    HS Troponin T 08/26/2023 <6  <10 ng/L Final        Smoking Cessation Counseling:  Never a smoker.  Patient does not currently use any tobacco products.     Follow Up   Return in about 2 weeks (around 12/4/2023) for Next scheduled follow up, FOR DU=YSURIA/URINE RETENTION/BLADDER SCAN.    Patient was given instructions and counseling regarding her condition or for health maintenance advice. Please see specific information pulled into the AVS if appropriate.          This document has been electronically signed by Griselda Cheng-Akwa, APRN   November 20, 2023 23:01 EST      Dictated Utilizing Dragon Dictation: Part of this note may be an electronic transcription/translation of spoken language to printed text using the Dragon Dictation System.    Transcribed from ambient dictation for Griselda Cheng-Akwa, APRN by Leslie Vargas.  11/20/23   14:48 EST    Patient or patient representative verbalized consent to the visit recording.  I have personally performed the services described in this document as transcribed by the above individual, and it is both accurate and complete.

## 2023-11-24 ENCOUNTER — NURSE TRIAGE (OUTPATIENT)
Dept: CALL CENTER | Facility: HOSPITAL | Age: 63
End: 2023-11-24
Payer: COMMERCIAL

## 2023-11-24 NOTE — TELEPHONE ENCOUNTER
Reason for Disposition   [1] Caller has URGENT medicine question about med that PCP or specialist prescribed AND [2] triager unable to answer question    Additional Information   Negative: [1] Intentional drug overdose AND [2] suicidal thoughts or ideas   Negative: Drug overdose and triager unable to answer question   Negative: Caller requesting a renewal or refill of a medicine patient is currently taking   Negative: Caller requesting information unrelated to medicine   Negative: Caller requesting information about COVID-19 Vaccine   Negative: Caller requesting information about Emergency Contraception   Negative: Caller requesting information about Combined Birth Control Pills   Negative: Caller requesting information about Progestin Birth Control Pills   Negative: Caller requesting information about Post-Op pain or medicines   Negative: Caller requesting a prescription antibiotic (such as Penicillin) for Strep throat and has a positive culture result   Negative: Caller requesting a prescription anti-viral med (such as Tamiflu) and has influenza (flu) symptoms   Negative: Immunization reaction suspected   Negative: Rash while taking a medicine or within 3 days of stopping it   Negative: [1] Asthma and [2] having symptoms of asthma (cough, wheezing, etc.)   Negative: [1] Symptom of illness (e.g., headache, abdominal pain, earache, vomiting) AND [2] more than mild   Negative: Breastfeeding questions about mother's medicines and diet   Negative: MORE THAN A DOUBLE DOSE of a prescription or over-the-counter (OTC) drug   Negative: [1] DOUBLE DOSE (an extra dose or lesser amount) of prescription drug AND [2] any symptoms (e.g., dizziness, nausea, pain, sleepiness)   Negative: [1] DOUBLE DOSE (an extra dose or lesser amount) of over-the-counter (OTC) drug AND [2] any symptoms (e.g., dizziness, nausea, pain, sleepiness)   Negative: Took another person's prescription drug   Negative: [1] DOUBLE DOSE (an extra dose or  "lesser amount) of prescription drug AND [2] NO symptoms  (Exception: A double dose of antibiotics.)   Negative: Diabetes drug error or overdose (e.g., took wrong type of insulin or took extra dose)   Negative: [1] Prescription not at pharmacy AND [2] was prescribed by PCP recently (Exception: Triager has access to EMR and prescription is recorded there. Go to Home Care and confirm for pharmacy.)   Negative: [1] Pharmacy calling with prescription question AND [2] triager unable to answer question    Answer Assessment - Initial Assessment Questions  1. NAME of MEDICINE: \"What medicine(s) are you calling about?\"      xarelto  2. QUESTION: \"What is your question?\" (e.g., double dose of medicine, side effect)      Missed 8am dose of xarelto this morning. Asking if should take it now, or resume schedule at 8pm dose.  3. PRESCRIBER: \"Who prescribed the medicine?\" Reason: if prescribed by specialist, call should be referred to that group.      ER physician for AF.  4. SYMPTOMS: \"Do you have any symptoms?\" If Yes, ask: \"What symptoms are you having?\"  \"How bad are the symptoms (e.g., mild, moderate, severe)      no  5. PREGNANCY:  \"Is there any chance that you are pregnant?\" \"When was your last menstrual period?\"      na    Protocols used: Medication Question Call-ADULT-AH    "

## 2023-11-24 NOTE — TELEPHONE ENCOUNTER
Caller  missed her 8am dose of Xarelto this morning.  has consulted pharmacist who recommended resuming Xarelto at 8pm  with normal schedule. Patient  will take this evening's 8PM dose an hour early, and will begin taking her Xarelto at 7AM and 7PM daily to adjust schedule for her work. Denies any other questions/needs at this time.

## 2023-11-27 ENCOUNTER — OFFICE VISIT (OUTPATIENT)
Dept: CARDIOLOGY | Facility: CLINIC | Age: 63
End: 2023-11-27
Payer: COMMERCIAL

## 2023-11-27 ENCOUNTER — TELEPHONE (OUTPATIENT)
Dept: CARDIOLOGY | Facility: CLINIC | Age: 63
End: 2023-11-27
Payer: COMMERCIAL

## 2023-11-27 ENCOUNTER — PATIENT ROUNDING (BHMG ONLY) (OUTPATIENT)
Dept: CARDIOLOGY | Facility: CLINIC | Age: 63
End: 2023-11-27
Payer: COMMERCIAL

## 2023-11-27 VITALS
OXYGEN SATURATION: 99 % | SYSTOLIC BLOOD PRESSURE: 134 MMHG | WEIGHT: 151 LBS | BODY MASS INDEX: 24.27 KG/M2 | DIASTOLIC BLOOD PRESSURE: 82 MMHG | HEART RATE: 81 BPM | HEIGHT: 66 IN

## 2023-11-27 DIAGNOSIS — G47.33 OSA (OBSTRUCTIVE SLEEP APNEA): ICD-10-CM

## 2023-11-27 DIAGNOSIS — R55 SYNCOPE AND COLLAPSE: ICD-10-CM

## 2023-11-27 DIAGNOSIS — E78.5 DYSLIPIDEMIA: ICD-10-CM

## 2023-11-27 DIAGNOSIS — I48.0 PAROXYSMAL ATRIAL FIBRILLATION: Primary | ICD-10-CM

## 2023-11-27 DIAGNOSIS — R06.02 SHORTNESS OF BREATH: ICD-10-CM

## 2023-11-27 DIAGNOSIS — I70.0 ATHEROSCLEROSIS OF ABDOMINAL AORTA: ICD-10-CM

## 2023-11-27 PROCEDURE — 99204 OFFICE O/P NEW MOD 45 MIN: CPT | Performed by: SPECIALIST

## 2023-11-27 PROCEDURE — 93000 ELECTROCARDIOGRAM COMPLETE: CPT | Performed by: SPECIALIST

## 2023-11-27 RX ORDER — DIPHENOXYLATE HYDROCHLORIDE AND ATROPINE SULFATE 2.5; .025 MG/1; MG/1
TABLET ORAL DAILY
COMMUNITY

## 2023-11-27 RX ORDER — ASCORBIC ACID 500 MG
500 TABLET ORAL DAILY
COMMUNITY

## 2023-11-27 RX ORDER — MONTELUKAST SODIUM 10 MG/1
10 TABLET ORAL DAILY
COMMUNITY
Start: 2023-06-01

## 2023-11-27 NOTE — TELEPHONE ENCOUNTER
She works at the [employer]. 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

## 2023-11-27 NOTE — TELEPHONE ENCOUNTER
LM with medical records to send us her labs.       ----- Message from Diana Park MD sent at 11/27/2023  2:34 PM EST -----  Please obtain lipid profile from PCP office

## 2023-11-27 NOTE — PROGRESS NOTES
Subjective   Initial consultation, syncope, paroxysmal atrial fibrillation  Annmarie Hopkins is a 63 y.o. female who presents to day for Establish Care (A-FIB-new DX).    CHIEF COMPLIANT  Chief Complaint   Patient presents with    Establish Care     A-FIB-new DX       Active Problems:  Problem List Items Addressed This Visit          Cardiac and Vasculature    Paroxysmal atrial fibrillation - Primary    Relevant Medications    rivaroxaban (XARELTO) 20 MG tablet    Other Relevant Orders    Basic Metabolic Panel    Stress Test With Myocardial Perfusion One Day    Adult Transthoracic Echo Complete w/ Color, Spectral and Contrast if necessary per protocol    Home Sleep Study    Atherosclerosis of abdominal aorta    Relevant Orders    Stress Test With Myocardial Perfusion One Day    Adult Transthoracic Echo Complete w/ Color, Spectral and Contrast if necessary per protocol    Dyslipidemia       Pulmonary and Pneumonias    Shortness of breath    Relevant Orders    Cardiac Event Monitor       Sleep    THUY (obstructive sleep apnea)       Symptoms and Signs    Syncope and collapse    Relevant Orders    Adult Transthoracic Echo Complete w/ Color, Spectral and Contrast if necessary per protocol    Home Sleep Study       HPI  HPI  Back on the 19th of this month the patient woke up from sleep with cramping of both of her thighs and legs she started to get up fast because of the pain and she immediately passed out very briefly her  came around quickly and around her and try to lift her head up to set her up but then she passed out 1 more time again briefly there were no warning or other symptoms but she was seen at the emergency room and she was told that she was in atrial fibrillation at the time she never had any palpitations she is actually active physically and she is denying any chest pain but she does have a little bit of shortness of breath on doing things like lifting or walking fast there is no edema she has been  no history of hypertension or diabetes but she does have history of hyperlipidemia and she is not taking medications for that, she never smoked she also snores quite a bit at night actually her  said that she snores more almost all night long also sleepy and tired family history wise sounds like her heart father had history of atrial fibrillation and has he was cardioverted twice he also had history of stroke her mother mother had history of cancer, she had a sister had a heart attack her early 60s as well as carotid artery stenosis  PRIOR MEDS  Current Outpatient Medications on File Prior to Visit   Medication Sig Dispense Refill    alendronate (FOSAMAX) 70 MG tablet Take 1 tablet by mouth Every 7 (Seven) Days.      ascorbic acid (VITAMIN C) 500 MG tablet Take 1 tablet by mouth Daily.      Calcium Carb-Cholecalciferol (CALCIUM 600+D3) 600-200 MG-UNIT tablet Take  by mouth.      levocetirizine (XYZAL) 5 MG tablet TAKE 1 2 TO 1 TABLET BY MOUTH EVERY EVENING  5    levothyroxine (SYNTHROID, LEVOTHROID) 88 MCG tablet       montelukast (SINGULAIR) 10 MG tablet Take 1 tablet by mouth Daily.      multivitamin (MULTI-VITAMIN DAILY PO) Take  by mouth Daily.      tamsulosin (FLOMAX) 0.4 MG capsule 24 hr capsule Take 1 capsule by mouth Daily. 30 capsule 1    Turmeric 500 MG capsule Take  by mouth.      [DISCONTINUED] Rivaroxaban (Xarelto Starter Pack) tablet therapy pack starter pack Take one 15 mg tablet twice daily with food for 21 days.  Followed by one 20 mg tablet by mouth once daily with food. Take as directed 1 each 0    [DISCONTINUED] azithromycin (ZITHROMAX) 250 MG tablet Take 1 tablet by mouth Daily. (Patient not taking: Reported on 11/20/2023) 4 tablet 0    [DISCONTINUED] B Complex Vitamins (VITAMIN B COMPLEX) capsule capsule Take  by mouth Daily. (Patient not taking: Reported on 11/20/2023)      [DISCONTINUED] dexAMETHasone (DECADRON) 6 MG tablet Take 1 tablet by mouth Daily With Breakfast. (Patient not  "taking: Reported on 11/20/2023) 6 tablet 0    [DISCONTINUED] docusate sodium (STOOL SOFTENER) 100 MG capsule Take 100 mg by mouth 2 (Two) Times a Day. (Patient not taking: Reported on 11/27/2023)      [DISCONTINUED] phenazopyridine (Pyridium) 200 MG tablet Take 1 tablet by mouth 3 (Three) Times a Day As Needed for Dysuria or Bladder Spasms (STOP AFTER 3 DAYS, USE PRN ONLY). STOP AFTER 3 DAYS, USE PRN ONLY (Patient not taking: Reported on 11/27/2023) 20 tablet 0     No current facility-administered medications on file prior to visit.       ALLERGIES  Codeine    HISTORY  Past Medical History:   Diagnosis Date    Abdominal fibromatosis     Arthritis     Hypothyroid        Social History     Socioeconomic History    Marital status:    Tobacco Use    Smoking status: Never   Vaping Use    Vaping Use: Never used   Substance and Sexual Activity    Alcohol use: Never    Drug use: Never    Sexual activity: Defer       Family History   Problem Relation Age of Onset    Cancer Mother     Hypertension Father     Heart disease Father     Diabetes Father     Heart disease Paternal Grandfather     Breast cancer Paternal Cousin        Review of Systems   Respiratory:  Positive for shortness of breath. Negative for apnea, cough, choking, chest tightness, wheezing and stridor.    Cardiovascular:  Positive for palpitations. Negative for chest pain and leg swelling.       Objective     VITALS: /82   Pulse 81   Ht 167.6 cm (66\")   Wt 68.5 kg (151 lb)   SpO2 99%   BMI 24.37 kg/m²     LABS:   Lab Results (most recent)       None            IMAGING:   CT Abdomen Pelvis With Contrast    Result Date: 11/19/2023  Impression:  No CT evidence of an acute intra-abdominal or intrapelvic process.  This report was finalized on 11/19/2023 6:01 PM by Fei Vazquez MD.      XR Chest 1 View    Result Date: 8/26/2023  No radiographic evidence of acute cardiac or pulmonary disease.  This report was finalized on 8/26/2023 12:56 PM by " Dr. Félix Cuenca MD.        EXAM:  Physical Exam  Vitals reviewed.   Constitutional:       Appearance: She is well-developed.   HENT:      Head: Normocephalic and atraumatic.   Eyes:      Pupils: Pupils are equal, round, and reactive to light.   Neck:      Thyroid: No thyromegaly.      Vascular: No JVD.   Cardiovascular:      Rate and Rhythm: Normal rate and regular rhythm.      Heart sounds: Normal heart sounds. No murmur heard.     No friction rub. No gallop.   Pulmonary:      Effort: Pulmonary effort is normal. No respiratory distress.      Breath sounds: Normal breath sounds. No stridor. No wheezing or rales.   Chest:      Chest wall: No tenderness.   Musculoskeletal:         General: No tenderness or deformity.      Cervical back: Neck supple.   Skin:     General: Skin is warm and dry.   Neurological:      Mental Status: She is alert and oriented to person, place, and time.      Cranial Nerves: No cranial nerve deficit.      Coordination: Coordination normal.         Procedure     ECG 12 Lead    Date/Time: 11/27/2023 2:04 PM  Performed by: Diana Park MD    Authorized by: Diana Park MD         EKG: Normal sinus rhythm with incomplete right bundle branch block otherwise unremarkable EKG comparing with the EKG on November 19, 2023 the patient at the time was in atrial fibrillation with RVR  Assessment & Plan     Diagnoses and all orders for this visit:    1. Paroxysmal atrial fibrillation (Primary)  -     Basic Metabolic Panel; Future  -     Stress Test With Myocardial Perfusion One Day; Future  -     Adult Transthoracic Echo Complete w/ Color, Spectral and Contrast if necessary per protocol; Future  -     Home Sleep Study; Future  -     rivaroxaban (XARELTO) 20 MG tablet; Take 1 tablet by mouth Daily.  Dispense: 90 tablet; Refill: 1    2. Atherosclerosis of abdominal aorta  -     Stress Test With Myocardial Perfusion One Day; Future  -     Adult Transthoracic Echo Complete w/ Color, Spectral and Contrast  if necessary per protocol; Future    3. Dyslipidemia    4. THUY (obstructive sleep apnea)    5. Syncope and collapse  -     Adult Transthoracic Echo Complete w/ Color, Spectral and Contrast if necessary per protocol; Future  -     Home Sleep Study; Future    6. Shortness of breath  -     Cardiac Event Monitor; Future    Other orders  -     ECG 12 Lead    1.  Currently she is normal sinus rhythm she is already taking his Xarelto for thromboembolic prophylaxis I am going to get an event monitor for assessment of the burden of atrial fibrillation also get an echocardiac to assess cardiac function wall motion as well as stress testing for assessment of ischemia also for consideration of antiarrhythmic medications  2.  Her symptoms are consistent with sleep apnea we will refer her for home sleep study  3.  I reviewed the CT scan she had in the emergency room on the 19th of this month and the patient has significant calcification of the distal abdominal aorta and for this reason she has atherosclerosis I discussed that with her she said that she had history of hyperlipidemia she is not on a statin I will get lipid profile I will try to get it from her primary care physician  4.  I suspect the syncope was related to her hypotension but will monitor for now and will get the event monitor as mentioned above  5.  She was hypokalemic also in the emergency room which could have contributed to the atrial fibrillation also she has recovered recently from COVID-19 infection but I am going to repeat her potassium level for assessment    Return After stress test.                 MEDS ORDERED DURING VISIT:  New Medications Ordered This Visit   Medications    rivaroxaban (XARELTO) 20 MG tablet     Sig: Take 1 tablet by mouth Daily.     Dispense:  90 tablet     Refill:  1       As always, Hakan Jordan PA  I appreciate very much the opportunity to participate in the cardiovascular care of your patients. Please do not hesitate to  call me with any questions with regards to Annmarie Hopkins evaluation and management.         This document has been electronically signed by Diana Park MD  November 27, 2023 14:36 EST    This note is dictated utilizing voice recognition software.

## 2023-11-27 NOTE — PROGRESS NOTES
A [employer] message has been sent to the patient for patient rounding for [facility].

## 2023-11-27 NOTE — TELEPHONE ENCOUNTER
Patient is asking if she can have a letter for work restriction for 8 hours a day instead of 12 hours a day until follow up appt.

## 2023-12-04 ENCOUNTER — OFFICE VISIT (OUTPATIENT)
Dept: UROLOGY | Facility: CLINIC | Age: 63
End: 2023-12-04
Payer: COMMERCIAL

## 2023-12-04 VITALS
HEIGHT: 66 IN | DIASTOLIC BLOOD PRESSURE: 84 MMHG | WEIGHT: 150.8 LBS | SYSTOLIC BLOOD PRESSURE: 134 MMHG | BODY MASS INDEX: 24.23 KG/M2 | HEART RATE: 76 BPM

## 2023-12-04 DIAGNOSIS — N32.89 BLADDER SPASMS: ICD-10-CM

## 2023-12-04 DIAGNOSIS — R33.9 RETENTION OF URINE: ICD-10-CM

## 2023-12-04 DIAGNOSIS — N34.3 DYSURIA-FREQUENCY SYNDROME: Primary | ICD-10-CM

## 2023-12-04 PROBLEM — R39.82 CHRONIC BLADDER PAIN: Status: ACTIVE | Noted: 2023-12-04

## 2023-12-04 LAB
BILIRUB BLD-MCNC: NEGATIVE MG/DL
CLARITY, POC: CLEAR
COLOR UR: YELLOW
EXPIRATION DATE: NORMAL
GLUCOSE UR STRIP-MCNC: NEGATIVE MG/DL
KETONES UR QL: NEGATIVE
LEUKOCYTE EST, POC: NEGATIVE
Lab: NORMAL
NITRITE UR-MCNC: NEGATIVE MG/ML
PH UR: 7 [PH] (ref 5–8)
PROT UR STRIP-MCNC: NEGATIVE MG/DL
RBC # UR STRIP: NEGATIVE /UL
SP GR UR: 1 (ref 1–1.03)
UROBILINOGEN UR QL: NORMAL

## 2023-12-04 PROCEDURE — 87086 URINE CULTURE/COLONY COUNT: CPT | Performed by: NURSE PRACTITIONER

## 2023-12-04 NOTE — ASSESSMENT & PLAN NOTE
She will continue tamsulosin 0.4 mg daily.   If she starts straining to urinate, she will call and we will schedule her for a cystoscopy.  Continue intermittent catheterization if need be. She was advised to do that prior to going to the emergency department.  She was advised she may take MiraLAX twice daily if needed.  She will follow up in 3 months for another reevaluation.

## 2023-12-04 NOTE — PROGRESS NOTES
Chief Complaint  DYSURIA/URINE RETENTION/BLADDER SCAN. (2 WEEK VOIDING TRIAL)    Subjective          Annmarie Hopkins presents to [facility] for retione resolved-acute cystitis  History of Present Illness    Mrs. Annmarie Hopkins is a pleasant 63-year-old female who presents to clinic today for evaluation. This is a 2-week follow-up with prior concerns of urinary retention and dysuria. Initially evaluated in clinic on 11/20/2023, Patient was an ER follow-up. She did have a Shaikh catheter in place secondary to difficulty urinating for over 12 hours with a bladder scan greater than 800 cc. Patient also had a CT abdomen and pelvis completed which was unremarkable with THE CAUSE of her urinary retention not noted on CT.     For plan of care we had discussed starting tamsulosin 0.4 mg daily. Her Shaikh catheter was discontinued. She was taught the technique of intermittent catheterization in case she was unable to urinate. She did deny any groin trauma prior to her urinary retention episode. She denied any issues with ureteral stricture. She did not have recurrent UTIs.     She has done relatively well and returns to clinic today for reevaluation. Her urinalysis is completely negative for leukocyte esterase, is negative for nitrite, is negative for gross/microscopic hematuria. Her postvoid residual is 0 mL. We had discussed a cystoscopic evaluation if symptoms were to persist which was deferred during last clinic evaluation.    TODAY SHE IS IN NO DISCOMFORT AND REPORTS  DOING WELL. The patient denies any episodes of being unable to urinate since her last visit. Her urine stream has been slow. She attributes it to not drinking enough. She is abstaining from soft drinks and is only drinking water. She denies any burning with urination or urinary frequency. She has nocturia 1 to 2 times during her daily sleep. The patient works nightshift. She denies any hematuria and has not had to  "catheterize her bladder. She feels better than she did 2 weeks ago.     She has constipation and is taking MiraLAX DELIGENTLY.    The patient explains that when she had the syncopal episode, she had come in, urinated, and gone to bed. She woke up 1.5 hours later with cramps.She mentions trying to get her doctor to give her an 8-hour restriction for work, but it was denied even though she moves quickly, constantly moves her arms, and occasionally has to lift 30 to 35 pounds.     She has atrial fibrillation and no restrictions. She is scheduled for an echocardiogram and stress test in mid- 01/2024. The patient plans to see if her primary care provider will provide give her the 8-hour restriction.    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    Bladder spasms 11/20/2023    Retention of urine 11/20/2023    Paroxysmal atrial fibrillation 11/27/2023    Atherosclerosis of abdominal aorta 11/27/2023    Dyslipidemia 11/27/2023    THUY (obstructive sleep apnea) 11/27/2023    Syncope and collapse 11/27/2023    Shortness of breath 11/27/2023    Chronic bladder pain 12/04/2023     Resolved Ambulatory Problems     Diagnosis Date Noted    No Resolved Ambulatory Problems     Past Medical History:   Diagnosis Date    Abdominal fibromatosis     Arthritis     Hypothyroid       Objective   Vital Signs:   /84   Pulse 76   Ht 167.6 cm (65.98\")   Wt 68.4 kg (150 lb 12.8 oz)   BMI 24.35 kg/m²       ROS:   Review of Systems   Constitutional:  Positive for activity change and fatigue. Negative for appetite change, chills, diaphoresis, fever, unexpected weight gain and unexpected weight loss.   HENT: Negative.  Negative for congestion, ear discharge, ear pain, nosebleeds, rhinorrhea, sinus pressure and sore throat.    Eyes: Negative.  Negative for blurred vision, double vision, photophobia, pain, redness and visual disturbance.   Respiratory: Negative.  Negative for apnea, cough, " chest tightness, shortness of breath, wheezing and stridor.    Cardiovascular: Negative.  Negative for chest pain and palpitations.   Gastrointestinal:  Positive for abdominal distention and constipation. Negative for abdominal pain, diarrhea, nausea and vomiting.   Endocrine: Negative.  Negative for polydipsia, polyphagia and polyuria.   Genitourinary:  Positive for frequency and urgency. Negative for decreased urine volume, difficulty urinating, dyspareunia, dysuria, flank pain, genital sores, hematuria, pelvic pain, pelvic pressure, urinary incontinence and vaginal discharge.   Musculoskeletal:  Positive for back pain and myalgias. Negative for arthralgias and joint swelling.   Skin:  Positive for color change and dry skin. Negative for pallor, rash and wound.   Allergic/Immunologic: Negative.    Neurological: Negative.  Negative for dizziness, tremors, syncope, weakness, light-headedness, headache, memory problem and confusion.   Hematological: Negative.    Psychiatric/Behavioral:  Positive for sleep disturbance and stress. Negative for behavioral problems and decreased concentration.         Physical Exam  Constitutional:       General: She is in acute distress.      Appearance: She is well-developed. She is ill-appearing.   HENT:      Head: Normocephalic and atraumatic.   Eyes:      Pupils: Pupils are equal, round, and reactive to light.   Neck:      Thyroid: No thyromegaly.      Trachea: No tracheal deviation.   Cardiovascular:      Rate and Rhythm: Normal rate and regular rhythm.      Heart sounds: No murmur heard.  Pulmonary:      Effort: Pulmonary effort is normal. No respiratory distress.      Breath sounds: Normal breath sounds. No stridor. No wheezing.   Abdominal:      General: Bowel sounds are normal. There is distension.      Palpations: Abdomen is soft.      Tenderness: There is abdominal tenderness.   Genitourinary:     Labia:         Right: No tenderness.         Left: No tenderness.       Vagina:  Normal. No vaginal discharge.      Comments: Urine frequency/urgency intermittent  Musculoskeletal:         General: Tenderness present. No deformity. Normal range of motion.      Cervical back: Normal range of motion.   Skin:     General: Skin is warm and dry.      Capillary Refill: Capillary refill takes less than 2 seconds.      Coloration: Skin is pale.      Findings: No erythema or rash.   Neurological:      Mental Status: She is alert and oriented to person, place, and time.      Cranial Nerves: No cranial nerve deficit.      Sensory: No sensory deficit.      Motor: Weakness present.      Coordination: Coordination normal.   Psychiatric:         Behavior: Behavior normal.         Thought Content: Thought content normal.         Judgment: Judgment normal.        Result Review :     UA          11/19/2023    15:29 12/4/2023    14:08   Urinalysis   Squamous Epithelial Cells, UA None Seen     Specific Gravity, UA 1.007     Ketones, UA Negative  Negative    Blood, UA Negative     Leukocytes, UA Small (1+)  Negative    Nitrite, UA Negative     RBC, UA 0-2     WBC, UA 3-5     Bacteria, UA None Seen       Urine Culture          12/4/2023    14:08   Urine Culture   Urine Culture No growth             Assessment and Plan    Problem List Items Addressed This Visit          Genitourinary and Reproductive     Dysuria-frequency syndrome - Primary    Current Assessment & Plan     She will continue tamsulosin 0.4 mg daily.   If she starts straining to urinate, she will call and we will schedule her for a cystoscopy.  Continue intermittent catheterization if need be. She was advised to do that prior to going to the emergency department.  She was advised she may take MiraLAX twice daily if needed.  She will follow up in 3 months for another reevaluation.          Relevant Orders    POC Urinalysis Dipstick, Automated (Completed)    Urine Culture - Urine, Urine, Clean Catch (Completed)    US non-ob transvaginal    US Renal  Bilateral    Bladder spasms    Current Assessment & Plan     She will continue tamsulosin 0.4 mg daily.   If she starts straining to urinate, she will call and we will schedule her for a cystoscopy.  Continue intermittent catheterization if need be. She was advised to do that prior to going to the emergency department.  She was advised she may take MiraLAX twice daily if needed.  She will follow up in 3 months for another reevaluation.          Relevant Orders    US non-ob transvaginal    US Renal Bilateral    Retention of urine    Overview     The patient will start Flomax  She will follow up in 2 weeks for a voiding trial.  If she is still having symptoms in 2 weeks, we will schedule a cystoscopy.         Current Assessment & Plan     She will continue tamsulosin 0.4 mg daily.   If she starts straining to urinate, she will call and we will schedule her for a cystoscopy.  Continue intermittent catheterization if need be. She was advised to do that prior to going to the emergency department.  She was advised she may take MiraLAX twice daily if needed.  She will follow up in 3 months for another reevaluation.           GORDON) 0.4 MG capsule 24 hr capsule                                       ASSESSMENT        URINE RETENTION/DYSURIA-FREQUENCY SYNDROME  Mrs. Annmarie HONEYCUTT is a pleasant 63-year-old female who presents to clinic today for RE-evaluation with PRIOR concerns of dysuria, requiring an indwelling Shaikh catheter-DISCONTINUED.      Patient developed urinary retention and a syncope episode requiring ER evaluation.  She denies any urinary symptoms prior to episode.  SHe denies having any groin trauma, nevertheless we discussed the different causes of painful urination such as infection, inflammation, dietary factors, or problems with her bladder, versus kidney stones.     We discussed inflammation that can be caused by irritation to the skin, or dysuria caused by and underlying bladder conditions.We also  discussed urinary tract infections which can be easily treated with antibiotics.  However her urine dipstick is completely negative for any infection today.      We discussed anatomic obstructions or malformations due to ureteral stricture, pain due to external lesions on the genitalia, with urine touching this lesions causing pain.  Discussed external irritation reaction from frequent douching, or application of irritating/allergenic products to the vaginal area.  Patient denies utilizing any of these products.     IBS- Patient has significant irritable bowel syndrome, characterized by extreme amounts of constipation.  We spoke about the impact of this on bladder function.  We spoke about  its relationship to recurrent urinary tract infections.  We discussed the need for increasing p.o. fluid intake to at least 2 to 3 L of water daily, and discussed the physiology of colonic motility as well as use of MiraLAX as a bulk laxative versus the newer class of serotonin uptake blockers such as Linzess.  We stressed the need for a daily bowel movement and discussed the [place] stool scale at length.We also discussed a referral to the GI nurse practitioner                                                  PLAN  Patient started/WILL CONTINUE  on tamsulosin 0.4 mg today.     We discussed intermittent catheterization, patient/ able to RETURN DEMONSTRATE technique with sample catheters given for self cath in case of difficulty urinating.     We will REsend her urine out for culture, I will call her with results if any positive bacterial growth.     We discussed several things She can do to reduce the discomfort of painful urination/straining on urination such as increasing his p.o. fluid intake with water, and avoiding bladder irritants such as caffeine products, cola, soda drinks, and nicotine, also avoiding issues with constipation, as it puts pressure on the bladder.      She is agreeable to watching HER diet to  prevent  any bladder irritants such as citrus, caffeine, cola, soda drinks     She will continue the Flomax to see if it helps HER symptoms.     Also recommend bowel regimen due to IBS with constipation.  Patient recommended daily MiraLAX, and prune juice/prunes as tolerated     We discussed lower tract investigation, via cystoscopy with Dr. Manning-deferred at this time per patient's request.  Wants to try medications first      Will see her back for follow-up in 2 weeks for bladder rescan.     She may return sooner sooner if symptoms worsen      Patient/Spouse is agreeable with plan of care       Patient reports that she is not currently experiencing any symptoms of urinary incontinence.    BMI is within normal parameters. No other follow-up for BMI required.    RADIOLOGY (CT AND/OR KUB):    CT Abdomen and Pelvis: No results found for this or any previous visit.     CT Stone Protocol: No results found for this or any previous visit.     KUB: No results found for this or any previous visit.       LABS (3 MONTHS):    Office Visit on 12/04/2023   Component Date Value Ref Range Status    Color 12/04/2023 Yellow  Yellow, Straw, Dark Yellow, Haley Final    Clarity, UA 12/04/2023 Clear  Clear Final    Specific Gravity  12/04/2023 1.005  1.005 - 1.030 Final    pH, Urine 12/04/2023 7.0  5.0 - 8.0 Final    Leukocytes 12/04/2023 Negative  Negative Final    Nitrite, UA 12/04/2023 Negative  Negative Final    Protein, POC 12/04/2023 Negative  Negative mg/dL Final    Glucose, UA 12/04/2023 Negative  Negative mg/dL Final    Ketones, UA 12/04/2023 Negative  Negative Final    Urobilinogen, UA 12/04/2023 Normal  Normal, 0.2 E.U./dL Final    Bilirubin 12/04/2023 Negative  Negative Final    Blood, UA 12/04/2023 Negative  Negative Final    Lot Number 12/04/2023 [id number]   Final    Expiration Date 12/04/2023 10/25/2024   Final    Urine Culture 12/04/2023 No growth   Final   Admission on 11/19/2023, Discharged on 11/19/2023    Component Date Value Ref Range Status    QT Interval 11/19/2023 352  ms Final    QTC Interval 11/19/2023 493  ms Final    Glucose 11/19/2023 131 (H)  65 - 99 mg/dL Final    BUN 11/19/2023 13  8 - 23 mg/dL Final    Creatinine 11/19/2023 0.71  0.57 - 1.00 mg/dL Final    Sodium 11/19/2023 137  136 - 145 mmol/L Final    Potassium 11/19/2023 2.8 (L)  3.5 - 5.2 mmol/L Final    Chloride 11/19/2023 99  98 - 107 mmol/L Final    CO2 11/19/2023 23.7  22.0 - 29.0 mmol/L Final    Calcium 11/19/2023 9.3  8.6 - 10.5 mg/dL Final    BUN/Creatinine Ratio 11/19/2023 18.3  7.0 - 25.0 Final    Anion Gap 11/19/2023 14.3  5.0 - 15.0 mmol/L Final    eGFR 11/19/2023 96.3  >60.0 mL/min/1.73 Final    HS Troponin T 11/19/2023 9  <14 ng/L Final    WBC 11/19/2023 7.67  3.40 - 10.80 10*3/mm3 Final    RBC 11/19/2023 4.86  3.77 - 5.28 10*6/mm3 Final    Hemoglobin 11/19/2023 14.7  12.0 - 15.9 g/dL Final    Hematocrit 11/19/2023 42.8  34.0 - 46.6 % Final    MCV 11/19/2023 88.1  79.0 - 97.0 fL Final    MCH 11/19/2023 30.2  26.6 - 33.0 pg Final    MCHC 11/19/2023 34.3  31.5 - 35.7 g/dL Final    RDW 11/19/2023 11.9 (L)  12.3 - 15.4 % Final    RDW-SD 11/19/2023 38.3  37.0 - 54.0 fl Final    MPV 11/19/2023 9.9  6.0 - 12.0 fL Final    Platelets 11/19/2023 294  140 - 450 10*3/mm3 Final    Neutrophil % 11/19/2023 70.2  42.7 - 76.0 % Final    Lymphocyte % 11/19/2023 19.6  19.6 - 45.3 % Final    Monocyte % 11/19/2023 8.5  5.0 - 12.0 % Final    Eosinophil % 11/19/2023 0.9  0.3 - 6.2 % Final    Basophil % 11/19/2023 0.5  0.0 - 1.5 % Final    Immature Grans % 11/19/2023 0.3  0.0 - 0.5 % Final    Neutrophils, Absolute 11/19/2023 5.39  1.70 - 7.00 10*3/mm3 Final    Lymphocytes, Absolute 11/19/2023 1.50  0.70 - 3.10 10*3/mm3 Final    Monocytes, Absolute 11/19/2023 0.65  0.10 - 0.90 10*3/mm3 Final    Eosinophils, Absolute 11/19/2023 0.07  0.00 - 0.40 10*3/mm3 Final    Basophils, Absolute 11/19/2023 0.04  0.00 - 0.20 10*3/mm3 Final    Immature Grans, Absolute  11/19/2023 0.02  0.00 - 0.05 10*3/mm3 Final    nRBC 11/19/2023 0.0  0.0 - 0.2 /100 WBC Final    Lactate 11/19/2023 2.0  0.5 - 2.0 mmol/L Final    Extra Tube 11/19/2023 Hold for add-ons.   Final    Auto resulted.    Extra Tube 11/19/2023 hold for add-on   Final    Auto resulted    Extra Tube 11/19/2023 Hold for add-ons.   Final    Auto resulted.    Extra Tube 11/19/2023 Hold for add-ons.   Final    Auto resulted    Color, UA 11/19/2023 Yellow  Yellow, Straw Final    Appearance, UA 11/19/2023 Clear  Clear Final    pH, UA 11/19/2023 8.5 (H)  5.0 - 8.0 Final    Specific Gravity, UA 11/19/2023 1.007  1.005 - 1.030 Final    Glucose, UA 11/19/2023 Negative  Negative Final    Ketones, UA 11/19/2023 Negative  Negative Final    Bilirubin, UA 11/19/2023 Negative  Negative Final    Blood, UA 11/19/2023 Negative  Negative Final    Protein, UA 11/19/2023 Negative  Negative Final    Leuk Esterase, UA 11/19/2023 Small (1+) (A)  Negative Final    Nitrite, UA 11/19/2023 Negative  Negative Final    Urobilinogen, UA 11/19/2023 0.2 E.U./dL  0.2 - 1.0 E.U./dL Final    RBC, UA 11/19/2023 0-2  None Seen, 0-2 /HPF Final    WBC, UA 11/19/2023 3-5 (A)  None Seen, 0-2 /HPF Final    Urine culture not indicated.    Bacteria, UA 11/19/2023 None Seen  None Seen /HPF Final    Squamous Epithelial Cells, UA 11/19/2023 None Seen  None Seen, 0-2 /HPF Final    Hyaline Casts, UA 11/19/2023 None Seen  None Seen /LPF Final    Methodology 11/19/2023 Automated Microscopy   Final    Magnesium 11/19/2023 1.7  1.6 - 2.4 mg/dL Final        Smoking Cessation Counseling:  Never a smoker.  Patient does not currently use any tobacco products.       Follow Up   Return in about 3 months (around 3/4/2024) for Next scheduled follow up for DYSURIA WITH RETENTION/INCOMPLETE EMPTYING/CYSTITIS.    Patient was given instructions and counseling regarding her condition or for health maintenance advice. Please see specific information pulled into the AVS if appropriate.           This document has been electronically signed by Griselda Cheng-Akwa, APRN   December 6, 2023 22:02 EST      Dictated Utilizing Dragon Dictation: Part of this note may be an electronic transcription/translation of spoken language to printed text using the Dragon Dictation System.    Transcribed from ambient dictation for Griselda Cheng-Akwa, APRN by Radha Camarena.  12/04/23   15:15 EST    Patient or patient representative verbalized consent to the visit recording.  I have personally performed the services described in this document as transcribed by the above individual, and it is both accurate and complete.

## 2023-12-06 ENCOUNTER — TELEPHONE (OUTPATIENT)
Dept: UROLOGY | Facility: CLINIC | Age: 63
End: 2023-12-06
Payer: COMMERCIAL

## 2023-12-06 LAB — BACTERIA SPEC AEROBE CULT: NO GROWTH

## 2023-12-06 NOTE — TELEPHONE ENCOUNTER
I called the pt letting her know that her urine culture was negative for any bacterial infection and to increase her water intake. The pt verbalized understanding.                             ----- Message from Griselda Cheng-Akwa, APRN sent at 12/5/2023  4:11 PM EST -----  Please kindly let patient know her urine culture results are negative for any bacterial infection at this time.    However she may drop off another urine analysis for RE-evaluation if she feels symptomatic.  If not encourage increasing p.o. fluid intake, avoiding any bladder irritants such as caffeine products, and follow-up in clinic as discussed.    Urine Culture - No growth    Thank you

## 2023-12-07 ENCOUNTER — TELEPHONE (OUTPATIENT)
Dept: UROLOGY | Facility: CLINIC | Age: 63
End: 2023-12-07
Payer: COMMERCIAL

## 2023-12-07 NOTE — TELEPHONE ENCOUNTER
Routing to Truman marks- she said orders were entered on error and they were since cancelled I made the pt aw

## 2023-12-07 NOTE — TELEPHONE ENCOUNTER
Caller: Annmarie Hopkins     Relationship: SELF    Best call back number: [phone]     What is the best time to reach you: ANY TIME    Who are you requesting to speak with (clinical staff, provider,  specific staff member): CLINICAL    Do you know the name of the person who called: N/A    What was the call regarding: PT HAS RECEIVED MULTIPLE CALLS FROM CENTRAL SCHEDULING, PT UNAWARE OF WHAT THEY WERE CALLING ABOUT. THERE ARE 2 ORDERS EL PUT IN HER NOTES BUT PT WASN'T AWARE. WHEN DOES SHE NEED TO HAVE THE U/S'S COMPLETED BY?    PLEASE GIVE PT A CALL BACK TO ADVISE.    Is it okay if the provider responds through [facility]: PLEASE CALL PT

## 2024-01-22 ENCOUNTER — TELEPHONE (OUTPATIENT)
Dept: CARDIOLOGY | Facility: CLINIC | Age: 64
End: 2024-01-22
Payer: COMMERCIAL

## 2024-01-22 NOTE — TELEPHONE ENCOUNTER
Hub to relay.     Called pt to advise her that we will call her if anything is abnormal we will call her. She expressed understanding.     She stated [facility] had called to set up a c-pap machine. I advised her that we have not sent in an order for that. She expressed understanding.

## 2024-01-22 NOTE — TELEPHONE ENCOUNTER
Caller: Annmarie Hopkins    Relationship: Self    Best call back number: [phone]     Caller requesting test results: SELF    What test was performed: SLEEP STUDY    Additional notes: PT GOT A CALL FROM [facility], REQUESTING SLEEP STUDY RESULTS.

## 2024-01-25 ENCOUNTER — LAB (OUTPATIENT)
Dept: LAB | Facility: HOSPITAL | Age: 64
End: 2024-01-25
Payer: COMMERCIAL

## 2024-01-25 DIAGNOSIS — I48.0 PAROXYSMAL ATRIAL FIBRILLATION: ICD-10-CM

## 2024-01-25 LAB
ANION GAP SERPL CALCULATED.3IONS-SCNC: 12.3 MMOL/L (ref 5–15)
BUN SERPL-MCNC: 12 MG/DL (ref 8–23)
BUN/CREAT SERPL: 17.1 (ref 7–25)
CALCIUM SPEC-SCNC: 8.9 MG/DL (ref 8.6–10.5)
CHLORIDE SERPL-SCNC: 107 MMOL/L (ref 98–107)
CO2 SERPL-SCNC: 23.7 MMOL/L (ref 22–29)
CREAT SERPL-MCNC: 0.7 MG/DL (ref 0.57–1)
EGFRCR SERPLBLD CKD-EPI 2021: 97.9 ML/MIN/1.73
GLUCOSE SERPL-MCNC: 97 MG/DL (ref 65–99)
POTASSIUM SERPL-SCNC: 3.8 MMOL/L (ref 3.5–5.2)
SODIUM SERPL-SCNC: 143 MMOL/L (ref 136–145)

## 2024-01-25 PROCEDURE — 36415 COLL VENOUS BLD VENIPUNCTURE: CPT

## 2024-01-25 PROCEDURE — 80048 BASIC METABOLIC PNL TOTAL CA: CPT

## 2024-01-26 ENCOUNTER — HOSPITAL ENCOUNTER (OUTPATIENT)
Dept: NUCLEAR MEDICINE | Facility: HOSPITAL | Age: 64
Discharge: HOME | End: 2024-01-26
Payer: COMMERCIAL

## 2024-01-26 ENCOUNTER — HOSPITAL ENCOUNTER (OUTPATIENT)
Dept: CARDIOLOGY | Facility: HOSPITAL | Age: 64
Discharge: HOME | End: 2024-01-26
Payer: COMMERCIAL

## 2024-01-26 DIAGNOSIS — I48.0 PAROXYSMAL ATRIAL FIBRILLATION: ICD-10-CM

## 2024-01-26 DIAGNOSIS — I70.0 ATHEROSCLEROSIS OF ABDOMINAL AORTA: ICD-10-CM

## 2024-01-26 DIAGNOSIS — R55 SYNCOPE AND COLLAPSE: ICD-10-CM

## 2024-01-26 LAB
BH CV ECHO MEAS - ACS: 2 CM
BH CV ECHO MEAS - AO MAX PG: 7.8 MMHG
BH CV ECHO MEAS - AO MEAN PG: 4 MMHG
BH CV ECHO MEAS - AO ROOT DIAM: 3.3 CM
BH CV ECHO MEAS - AO V2 MAX: 140 CM/SEC
BH CV ECHO MEAS - AO V2 VTI: 31.4 CM
BH CV ECHO MEAS - AVA(I,D): 2.22 CM2
BH CV ECHO MEAS - EDV(CUBED): 102.2 ML
BH CV ECHO MEAS - EDV(MOD-SP4): 86.9 ML
BH CV ECHO MEAS - EF(MOD-SP4): 68.8 %
BH CV ECHO MEAS - ESV(CUBED): 34.5 ML
BH CV ECHO MEAS - ESV(MOD-SP4): 27.1 ML
BH CV ECHO MEAS - FS: 30.4 %
BH CV ECHO MEAS - IVS/LVPW: 0.97 CM
BH CV ECHO MEAS - IVSD: 0.94 CM
BH CV ECHO MEAS - LA DIMENSION: 2.45 CM
BH CV ECHO MEAS - LAT PEAK E' VEL: 10.3 CM/SEC
BH CV ECHO MEAS - LV DIASTOLIC VOL/BSA (35-75): 49.1 CM2
BH CV ECHO MEAS - LV MASS(C)D: 152.1 GRAMS
BH CV ECHO MEAS - LV MAX PG: 4 MMHG
BH CV ECHO MEAS - LV MEAN PG: 2 MMHG
BH CV ECHO MEAS - LV SYSTOLIC VOL/BSA (12-30): 15.3 CM2
BH CV ECHO MEAS - LV V1 MAX: 99.4 CM/SEC
BH CV ECHO MEAS - LV V1 VTI: 22.2 CM
BH CV ECHO MEAS - LVIDD: 4.7 CM
BH CV ECHO MEAS - LVIDS: 3.3 CM
BH CV ECHO MEAS - LVOT AREA: 3.1 CM2
BH CV ECHO MEAS - LVOT DIAM: 2 CM
BH CV ECHO MEAS - LVPWD: 0.96 CM
BH CV ECHO MEAS - MED PEAK E' VEL: 4.7 CM/SEC
BH CV ECHO MEAS - MV A MAX VEL: 67.1 CM/SEC
BH CV ECHO MEAS - MV E MAX VEL: 62.9 CM/SEC
BH CV ECHO MEAS - MV E/A: 0.94
BH CV ECHO MEAS - PA ACC TIME: 0.14 SEC
BH CV ECHO MEAS - RAP SYSTOLE: 10 MMHG
BH CV ECHO MEAS - RVSP: 29.4 MMHG
BH CV ECHO MEAS - SI(MOD-SP4): 33.8 ML/M2
BH CV ECHO MEAS - SV(LVOT): 69.7 ML
BH CV ECHO MEAS - SV(MOD-SP4): 59.8 ML
BH CV ECHO MEAS - TAPSE (>1.6): 2.6 CM
BH CV ECHO MEAS - TR MAX PG: 19.4 MMHG
BH CV ECHO MEAS - TR MAX VEL: 220 CM/SEC
BH CV ECHO MEASUREMENTS AVERAGE E/E' RATIO: 8.39
BH CV NUCLEAR PRIOR STUDY: 3
BH CV REST NUCLEAR ISOTOPE DOSE: 10.2 MCI
BH CV STRESS BP STAGE 1: NORMAL
BH CV STRESS DURATION MIN STAGE 1: 3
BH CV STRESS DURATION MIN STAGE 2: 2
BH CV STRESS DURATION SEC STAGE 1: 0
BH CV STRESS DURATION SEC STAGE 2: 0
BH CV STRESS GRADE STAGE 1: 10
BH CV STRESS GRADE STAGE 2: 12
BH CV STRESS HR STAGE 1: 130
BH CV STRESS HR STAGE 2: 136
BH CV STRESS METS STAGE 1: 5
BH CV STRESS METS STAGE 2: 7.5
BH CV STRESS NUCLEAR ISOTOPE DOSE: 30.3 MCI
BH CV STRESS PROTOCOL 1: NORMAL
BH CV STRESS RECOVERY BP: NORMAL MMHG
BH CV STRESS RECOVERY HR: 87 BPM
BH CV STRESS SPEED STAGE 1: 1.7
BH CV STRESS SPEED STAGE 2: 2.5
BH CV STRESS STAGE 1: 1
BH CV STRESS STAGE 2: 2
LEFT ATRIUM VOLUME INDEX: 14.5 ML/M2
LV EF NUC BP: 64 %
MAXIMAL PREDICTED HEART RATE: 158 BPM
PERCENT MAX PREDICTED HR: 86.08 %
STRESS BASELINE BP: NORMAL MMHG
STRESS BASELINE HR: 68 BPM
STRESS PERCENT HR: 101 %
STRESS POST ESTIMATED WORKLOAD: 7 METS
STRESS POST EXERCISE DUR MIN: 5 MIN
STRESS POST PEAK BP: NORMAL MMHG
STRESS POST PEAK HR: 136 BPM
STRESS TARGET HR: 134 BPM

## 2024-01-26 PROCEDURE — 93017 CV STRESS TEST TRACING ONLY: CPT

## 2024-01-26 PROCEDURE — 93306 TTE W/DOPPLER COMPLETE: CPT

## 2024-01-26 PROCEDURE — 0 TECHNETIUM SESTAMIBI: Performed by: SPECIALIST

## 2024-01-26 PROCEDURE — A9500 TC99M SESTAMIBI: HCPCS | Performed by: SPECIALIST

## 2024-01-26 PROCEDURE — 78452 HT MUSCLE IMAGE SPECT MULT: CPT

## 2024-01-26 RX ADMIN — TECHNETIUM TC 99M SESTAMIBI 1 DOSE: 1 INJECTION INTRAVENOUS at 12:03

## 2024-01-26 RX ADMIN — TECHNETIUM TC 99M SESTAMIBI 1 DOSE: 1 INJECTION INTRAVENOUS at 10:50

## 2024-02-05 ENCOUNTER — TRANSCRIBE ORDERS (OUTPATIENT)
Dept: ADMINISTRATIVE | Facility: HOSPITAL | Age: 64
End: 2024-02-05
Payer: COMMERCIAL

## 2024-02-05 DIAGNOSIS — Z12.31 VISIT FOR SCREENING MAMMOGRAM: Primary | ICD-10-CM

## 2024-02-13 ENCOUNTER — OFFICE VISIT (OUTPATIENT)
Dept: CARDIOLOGY | Facility: CLINIC | Age: 64
End: 2024-02-13
Payer: COMMERCIAL

## 2024-02-13 VITALS
WEIGHT: 144.6 LBS | RESPIRATION RATE: 16 BRPM | HEIGHT: 65 IN | OXYGEN SATURATION: 98 % | DIASTOLIC BLOOD PRESSURE: 74 MMHG | BODY MASS INDEX: 24.09 KG/M2 | SYSTOLIC BLOOD PRESSURE: 116 MMHG | HEART RATE: 74 BPM

## 2024-02-13 DIAGNOSIS — G47.33 OSA (OBSTRUCTIVE SLEEP APNEA): Primary | ICD-10-CM

## 2024-02-13 DIAGNOSIS — Z82.49 FAMILY HISTORY OF PREMATURE CAD: ICD-10-CM

## 2024-02-13 DIAGNOSIS — I48.0 PAROXYSMAL ATRIAL FIBRILLATION: ICD-10-CM

## 2024-02-13 DIAGNOSIS — E78.5 DYSLIPIDEMIA: ICD-10-CM

## 2024-02-13 PROCEDURE — 99214 OFFICE O/P EST MOD 30 MIN: CPT | Performed by: NURSE PRACTITIONER

## 2024-02-13 RX ORDER — ROSUVASTATIN CALCIUM 20 MG/1
20 TABLET, COATED ORAL DAILY
Qty: 30 TABLET | Refills: 3 | Status: SHIPPED | OUTPATIENT
Start: 2024-02-13

## 2024-02-26 ENCOUNTER — HOSPITAL ENCOUNTER (OUTPATIENT)
Dept: CT IMAGING | Facility: HOSPITAL | Age: 64
Discharge: HOME | End: 2024-02-26
Admitting: NURSE PRACTITIONER

## 2024-02-26 DIAGNOSIS — E78.5 DYSLIPIDEMIA: ICD-10-CM

## 2024-02-26 DIAGNOSIS — Z82.49 FAMILY HISTORY OF PREMATURE CAD: ICD-10-CM

## 2024-02-26 PROCEDURE — 75571 CT HRT W/O DYE W/CA TEST: CPT | Performed by: RADIOLOGY

## 2024-02-26 PROCEDURE — 75571 CT HRT W/O DYE W/CA TEST: CPT

## 2024-03-04 ENCOUNTER — OFFICE VISIT (OUTPATIENT)
Dept: UROLOGY | Facility: CLINIC | Age: 64
End: 2024-03-04
Payer: COMMERCIAL

## 2024-03-04 VITALS
SYSTOLIC BLOOD PRESSURE: 134 MMHG | BODY MASS INDEX: 23.49 KG/M2 | DIASTOLIC BLOOD PRESSURE: 86 MMHG | HEART RATE: 78 BPM | HEIGHT: 65 IN | WEIGHT: 141 LBS

## 2024-03-04 DIAGNOSIS — N32.89 BLADDER SPASMS: ICD-10-CM

## 2024-03-04 DIAGNOSIS — R39.82 CHRONIC BLADDER PAIN: ICD-10-CM

## 2024-03-04 DIAGNOSIS — R33.9 RETENTION OF URINE: ICD-10-CM

## 2024-03-04 DIAGNOSIS — N34.3 DYSURIA-FREQUENCY SYNDROME: Primary | ICD-10-CM

## 2024-03-04 LAB
BILIRUB BLD-MCNC: NEGATIVE MG/DL
CLARITY, POC: CLEAR
COLOR UR: YELLOW
EXPIRATION DATE: NORMAL
GLUCOSE UR STRIP-MCNC: NEGATIVE MG/DL
KETONES UR QL: NEGATIVE
LEUKOCYTE EST, POC: NEGATIVE
Lab: NORMAL
NITRITE UR-MCNC: NEGATIVE MG/ML
PH UR: 6.5 [PH] (ref 5–8)
PROT UR STRIP-MCNC: NEGATIVE MG/DL
RBC # UR STRIP: NEGATIVE /UL
SP GR UR: 1.01 (ref 1–1.03)
UROBILINOGEN UR QL: NORMAL

## 2024-03-04 PROCEDURE — 87086 URINE CULTURE/COLONY COUNT: CPT | Performed by: NURSE PRACTITIONER

## 2024-03-04 RX ORDER — TAMSULOSIN HYDROCHLORIDE 0.4 MG/1
1 CAPSULE ORAL DAILY
Qty: 90 CAPSULE | Refills: 3 | Status: SHIPPED | OUTPATIENT
Start: 2024-03-04 | End: 2024-06-02

## 2024-03-04 RX ORDER — PSEUDOEPHEDRINE HYDROCHLORIDE 60 MG/1
TABLET, FILM COATED ORAL
COMMUNITY
Start: 2023-09-19 | End: 2024-03-05

## 2024-03-04 RX ORDER — TAMSULOSIN HYDROCHLORIDE 0.4 MG/1
0.4 CAPSULE ORAL DAILY
COMMUNITY
Start: 2023-11-20 | End: 2024-03-05

## 2024-03-04 RX ORDER — CLOBETASOL PROPIONATE 0.5 MG/G
OINTMENT TOPICAL
COMMUNITY
Start: 2024-02-28

## 2024-03-04 NOTE — PROGRESS NOTES
Chief Complaint  Dysuria (3 MONTH FU ) and Urinary Retention    Subjective          Annmarie Hopkins presents to [facility] for DYSURIA-IMPROVED  History of Present Illness    The patient is a pleasant 63-year-old female who returns to clinic today for evaluation. This is a 3-month follow-up with prior concerns of urinary retention and dysuria, initially requiring Shaikh catheter.     When last evaluated in clinic on 12/04/2023, patient was post ER follow-up for urinary retention. She did have an indwelling Shaikh catheter in place, which were eventually discontinued after starting her on tamsulosin. Her postvoid residuals have been greater than 800 cc.     Patient also had a CT abdomen and pelvis completed, which was unremarkable with the cause of her urinary retention, not noted on CT. For her plan of care, we had discussed continuing tamsulosin 0.4 mg daily. She has tolerated medication well and returns to clinic today for recheck.     Her urinalysis today showed trace leukocyte esterase. It is negative for nitrite. It is negative for gross, BUT showed trace microscopic hematuria. Her postvoid residual is 0 mL. Patient's last urine culture was also negative for any bacterial growth. She has had constipation issues for which we strongly recommend daily MiraLAX due to the correlation between severe constipation and urinary retention. Overall, she is doing remarkably better.    She has lost 13 pounds since 11/2023 by avoiding fried foods, eating chicken and vegetables. The Shaikh catheter was painful, SHE desires to never have that replaced.. She has not had to self-catheterize. She denies any burning, frequency, or urgency. She inquires if she should continue taking the water pill. She takes it in the morning and sometimes before noon. She denies any swelling in her ankles. She denies any straining when she urinates. She was dehydrated, her potassium was low, and had bowel  "issues. She is not on blood pressure medication. She wears her night pad when she is out. She drinks decaf hot tea.    Her father had a double bypass in . Her mother passed away from a massive stroke. Her paternal grandfather  of a stroke at 62-year-old. Her older sister had a heart attack and a clotted artery.    She had an echocardiogram and stress test done, which she passed. She had a CT cardio calcium scan. Her total calcium score was 338. She has an appointment with her pulmonologist tomorrow.    Active Ambulatory Problems     Diagnosis Date Noted    Dysuria-frequency syndrome 2023    Bladder spasms 2023    Retention of urine 2023    Paroxysmal atrial fibrillation 2023    Atherosclerosis of abdominal aorta 2023    Dyslipidemia 2023    THUY (obstructive sleep apnea) 2023    Syncope and collapse 2023    Shortness of breath 2023    Chronic bladder pain 2023     Resolved Ambulatory Problems     Diagnosis Date Noted    No Resolved Ambulatory Problems     Past Medical History:   Diagnosis Date    Abdominal fibromatosis     Arthritis     Hypothyroid       Objective   Vital Signs:   /86   Pulse 78   Ht 165.1 cm (65\")   Wt 64 kg (141 lb)   BMI 23.46 kg/m²       ROS:   Review of Systems   Constitutional:  Positive for activity change, appetite change and unexpected weight loss. Negative for diaphoresis, fatigue, fever and unexpected weight gain.   HENT:  Negative for congestion, ear discharge, ear pain, nosebleeds, rhinorrhea, sinus pressure and sore throat.    Eyes:  Negative for blurred vision, double vision, photophobia, pain, redness and visual disturbance.   Respiratory:  Negative for apnea, cough, chest tightness, shortness of breath, wheezing and stridor.    Cardiovascular:  Negative for chest pain and palpitations.   Gastrointestinal:  Positive for abdominal distention, abdominal pain and constipation. Negative for diarrhea, nausea and " vomiting.   Endocrine: Negative for polydipsia, polyphagia and polyuria.   Genitourinary:  Positive for difficulty urinating, dysuria, flank pain, frequency, hematuria, pelvic pain, pelvic pressure and urgency. Negative for decreased urine volume and urinary incontinence.   Musculoskeletal:  Positive for back pain. Negative for arthralgias and joint swelling.   Skin:  Positive for dry skin and pallor. Negative for rash and wound.   Allergic/Immunologic: Negative for food allergies.   Neurological:  Negative for dizziness, tremors, syncope, weakness, light-headedness, headache, memory problem and confusion.   Hematological:  Bruises/bleeds easily.   Psychiatric/Behavioral:  Positive for sleep disturbance and stress. Negative for behavioral problems. The patient is not nervous/anxious.         Physical Exam  Constitutional:       General: She is in acute distress.      Appearance: She is well-developed. She is ill-appearing.   HENT:      Head: Normocephalic and atraumatic.   Eyes:      Pupils: Pupils are equal, round, and reactive to light.   Neck:      Thyroid: No thyromegaly.      Trachea: No tracheal deviation.   Cardiovascular:      Rate and Rhythm: Normal rate and regular rhythm.      Heart sounds: No murmur heard.  Pulmonary:      Effort: Pulmonary effort is normal. No respiratory distress.      Breath sounds: Normal breath sounds. No stridor. No wheezing.   Abdominal:      General: Bowel sounds are normal. There is distension.      Palpations: Abdomen is soft.      Tenderness: There is abdominal tenderness.   Genitourinary:     Labia:         Right: No tenderness.         Left: No tenderness.       Vagina: Normal. No vaginal discharge.      Comments: Slow urinary stream, dysuria, urinary retention-improved  Musculoskeletal:         General: Tenderness present. No deformity. Normal range of motion.      Cervical back: Normal range of motion.   Skin:     General: Skin is warm and dry.      Capillary Refill:  Capillary refill takes less than 2 seconds.      Coloration: Skin is not pale.      Findings: No erythema or rash.   Neurological:      Mental Status: She is alert and oriented to person, place, and time.      Cranial Nerves: No cranial nerve deficit.      Sensory: No sensory deficit.      Coordination: Coordination normal.   Psychiatric:         Behavior: Behavior normal.         Thought Content: Thought content normal.         Judgment: Judgment normal.        Result Review :     UA          11/19/2023    15:29 12/4/2023    14:08 3/4/2024    10:25   Urinalysis   Squamous Epithelial Cells, UA None Seen      Specific Gravity, UA 1.007      Ketones, UA Negative  Negative  Negative    Blood, UA Negative      Leukocytes, UA Small (1+)  Negative  Negative    Nitrite, UA Negative      RBC, UA 0-2      WBC, UA 3-5      Bacteria, UA None Seen        Urine Culture          12/4/2023    14:08   Urine Culture   Urine Culture No growth             Assessment and Plan    Problem List Items Addressed This Visit          Genitourinary and Reproductive     Dysuria-frequency syndrome - Primary    Relevant Orders    POC Urinalysis Dipstick, Automated (Completed)    Urine Culture - Urine, Urine, Random Void    Bladder spasms    Chronic bladder pain                                             ASSESSMENT   URINE RETENTION-IMPROVED/DYSURIA-FREQUENCY SYNDROME  Mrs. Annmarie HONEYCUTT is a pleasant 63-year-old female who presents to clinic today for RE-evaluation with PRIOR concerns of dysuria, requiring an indwelling Shaikh catheter-DISCONTINUED.  Patient reports doing relatively well on tamsulosin 0.4 mg and denies any side effects so far.  Her urinalysis today is complete negative for leukocyte esterase, it is negative for nitrites, it is negative for gross/microscopic hematuria.  Significantly her postvoid residual is 0 mL.      Patient developed urinary retention and a syncope episode requiring ER evaluation.  She denies any urinary  symptoms prior to episode.  SHe denies having any groin trauma, nevertheless we discussed the different causes of painful urination such as infection, inflammation, dietary factors, or problems with her bladder, versus kidney stones.     AGAIN, We discussed inflammation that can be caused by irritation to the skin, or dysuria caused by and underlying bladder conditions.We also discussed urinary tract infections which can be easily treated with antibiotics.  However her urine dipstick is completely negative for any infection today.      We discussed anatomic obstructions or malformations due to ureteral stricture, pain due to external lesions on the genitalia, with urine touching this lesions causing pain.  Discussed external irritation reaction from frequent douching, or application of irritating/allergenic products to the vaginal area.  Patient denies utilizing any of these products.     IBS- Patient has significant irritable bowel syndrome, characterized by extreme amounts of constipation.  We spoke about the impact of this on bladder function.  We spoke about  its relationship to recurrent urinary tract infections.  We discussed the need for increasing p.o. fluid intake to at least 2 to 3 L of water daily, and discussed the physiology of colonic motility as well as use of MiraLAX as a bulk laxative versus the newer class of serotonin uptake blockers such as Linzess.  We stressed the need for a daily bowel movement and discussed the [place] stool scale at length.We also discussed a referral to the GI nurse practitioner                                                  PLAN  Patient WILL CONTINUE  on tamsulosin 0.4 mg taking 1 capsule every other day x 5 months    Will stop tamsulosin ON month 6 prior to being evaluated.  Will reevaluate in clinic.    She has been advised to resume tamsulosin should she have any urinary symptoms of dysuria.     We  ALSO discussed intermittent catheterization, patient/ able to  RETURN DEMONSTRATE technique with sample catheters given for self cath in case of difficulty urinating.     We will REsend her urine out for culture, I will call her with results if any positive bacterial growth.     We discussed several things She can do to reduce the discomfort of painful urination/straining on urination such as increasing his p.o. fluid intake with water, and avoiding bladder irritants such as caffeine products, cola, soda drinks, and nicotine, also avoiding issues with constipation, as it puts pressure on the bladder.      She is agreeable to watching HER diet to prevent  any bladder irritants such as citrus, caffeine, cola, soda drinks     She will continue the Flomax to see if it helps HER symptoms.     Also recommend bowel regimen due to IBS with constipation.  Patient recommended daily MiraLAX, and prune juice/prunes as tolerated     We discussed lower tract investigation, via cystoscopy with Dr. Manning-deferred at this time per patient's request.  Wants to try medications first      Will see her back for follow-up in 6 months For bladder rescan.     She may return sooner sooner if symptoms worsen      Patient is agreeable with plan of care     6Patient reports that she is not currently experiencing any symptoms of urinary incontinence.      BMI is within normal parameters. No other follow-up for BMI required.      RADIOLOGY (CT AND/OR KUB):    CT Abdomen and Pelvis: No results found for this or any previous visit.     CT Stone Protocol: No results found for this or any previous visit.     KUB: No results found for this or any previous visit.       LABS (3 MONTHS):    Office Visit on 03/04/2024   Component Date Value Ref Range Status    Color 03/04/2024 Yellow  Yellow, Straw, Dark Yellow, Haley Final    Clarity, UA 03/04/2024 Clear  Clear Final    Specific Gravity  03/04/2024 1.010  1.005 - 1.030 Final    pH, Urine 03/04/2024 6.5  5.0 - 8.0 Final    Leukocytes 03/04/2024 Negative  Negative  Final    Nitrite, UA 03/04/2024 Negative  Negative Final    Protein, POC 03/04/2024 Negative  Negative mg/dL Final    Glucose, UA 03/04/2024 Negative  Negative mg/dL Final    Ketones, UA 03/04/2024 Negative  Negative Final    Urobilinogen, UA 03/04/2024 Normal  Normal, 0.2 E.U./dL Final    Bilirubin 03/04/2024 Negative  Negative Final    Blood, UA 03/04/2024 Negative  Negative Final    Lot Number 03/04/2024 N   Final    Expiration Date 03/04/2024 N   Final   Hospital Outpatient Visit on 01/26/2024   Component Date Value Ref Range Status    LVIDd 01/26/2024 4.7  cm Final    LVIDs 01/26/2024 3.3  cm Final    IVSd 01/26/2024 0.94  cm Final    LVPWd 01/26/2024 0.96  cm Final    FS 01/26/2024 30.4  % Final    IVS/LVPW 01/26/2024 0.97  cm Final    ESV(cubed) 01/26/2024 34.5  ml Final    LV Sys Vol (BSA corrected) 01/26/2024 15.3  cm2 Final    EDV(cubed) 01/26/2024 102.2  ml Final    LV [place] Vol (BSA corrected) 01/26/2024 49.1  cm2 Final    LV mass(C)d 01/26/2024 152.1  grams Final    LVOT area 01/26/2024 3.1  cm2 Final    LVOT diam 01/26/2024 2.00  cm Final    EDV(MOD-sp4) 01/26/2024 86.9  ml Final    ESV(MOD-sp4) 01/26/2024 27.1  ml Final    SV(MOD-sp4) 01/26/2024 59.8  ml Final    SI(MOD-sp4) 01/26/2024 33.8  ml/m2 Final    EF(MOD-sp4) 01/26/2024 68.8  % Final    MV E max justin 01/26/2024 62.9  cm/sec Final    MV A max justin 01/26/2024 67.1  cm/sec Final    MV E/A 01/26/2024 0.94   Final    LA ESV Index (BP) 01/26/2024 14.5  ml/m2 Final    Med Peak E' Justin 01/26/2024 4.7  cm/sec Final    Lat Peak E' Justin 01/26/2024 10.3  cm/sec Final    TR max justin 01/26/2024 220.0  cm/sec Final    Avg E/e' ratio 01/26/2024 8.39   Final    SV(LVOT) 01/26/2024 69.7  ml Final    TAPSE (>1.6) 01/26/2024 2.6  cm Final    LA dimension (2D)  01/26/2024 2.45  cm Final    LV V1 max 01/26/2024 99.4  cm/sec Final    LV V1 max PG 01/26/2024 4.0  mmHg Final    LV V1 mean PG 01/26/2024 2.00  mmHg Final    LV V1 VTI 01/26/2024 22.2  cm Final    Ao pk justin  01/26/2024 140.0  cm/sec Final    Ao max PG 01/26/2024 7.8  mmHg Final    Ao mean PG 01/26/2024 4.0  mmHg Final    Ao V2 VTI 01/26/2024 31.4  cm Final    LUIS DANIEL(I,D) 01/26/2024 2.22  cm2 Final    TR max PG 01/26/2024 19.4  mmHg Final    RVSP(TR) 01/26/2024 29.4  mmHg Final    RAP systole 01/26/2024 10.0  mmHg Final    PA acc time 01/26/2024 0.14  sec Final    Ao root diam 01/26/2024 3.3  cm Final    ACS 01/26/2024 2.00  cm Final   Hospital Outpatient Visit on 01/26/2024   Component Date Value Ref Range Status    BH CV REST NUCLEAR ISOTOPE DOSE 01/26/2024 10.2  mCi Final    Nuclear Prior Study 01/26/2024 3.0   Final    Exercise duration (min) 01/26/2024 5  min Final    Estimated workload 01/26/2024 7.0  METS Final    BH CV STRESS NUCLEAR ISOTOPE DOSE 01/26/2024 30.3  mCi Final     CV STRESS PROTOCOL 1 01/26/2024 Koffi   Final    Stage 1 01/26/2024 1.0   Final    HR Stage 1 01/26/2024 130   Final    BP Stage 1 01/26/2024 168/83   Final    Duration Min Stage 1 01/26/2024 3   Final    Duration Sec Stage 1 01/26/2024 0   Final    Grade Stage 1 01/26/2024 10   Final    Speed Stage 1 01/26/2024 1.7   Final    BH CV STRESS METS STAGE 1 01/26/2024 5.0   Final    Stage 2 01/26/2024 2.0   Final    HR Stage 2 01/26/2024 136   Final    Duration Min Stage 2 01/26/2024 2   Final    Duration Sec Stage 2 01/26/2024 0   Final    Grade Stage 2 01/26/2024 12   Final    Speed Stage 2 01/26/2024 2.5   Final    BH CV STRESS METS STAGE 2 01/26/2024 7.5   Final    Baseline HR 01/26/2024 68  bpm Final    Baseline BP 01/26/2024 160/88  mmHg Final    Peak HR 01/26/2024 136  bpm Final    Peak BP 01/26/2024 168/83  mmHg Final    Recovery HR 01/26/2024 87  bpm Final    Recovery BP 01/26/2024 157/91  mmHg Final    Target HR (85%) 01/26/2024 134  bpm Final    Max. Pred. HR (100%) 01/26/2024 158  bpm Final    Percent Max Pred HR 01/26/2024 86.08  % Final    Percent Target HR 01/26/2024 101  % Final    Nuc Stress EF 01/26/2024 64  % Final   Lab on  01/25/2024   Component Date Value Ref Range Status    Glucose 01/25/2024 97  65 - 99 mg/dL Final    BUN 01/25/2024 12  8 - 23 mg/dL Final    Creatinine 01/25/2024 0.70  0.57 - 1.00 mg/dL Final    Sodium 01/25/2024 143  136 - 145 mmol/L Final    Potassium 01/25/2024 3.8  3.5 - 5.2 mmol/L Final    Chloride 01/25/2024 107  98 - 107 mmol/L Final    CO2 01/25/2024 23.7  22.0 - 29.0 mmol/L Final    Calcium 01/25/2024 8.9  8.6 - 10.5 mg/dL Final    BUN/Creatinine Ratio 01/25/2024 17.1  7.0 - 25.0 Final    Anion Gap 01/25/2024 12.3  5.0 - 15.0 mmol/L Final    eGFR 01/25/2024 97.9  >60.0 mL/min/1.73 Final     1. Urinary retention.  Her bladder is completely empty today. I will refill her Flomax. She will take it every other day for 30 days and then take a break. If she starts feeling any trouble urinating, she will restart the Flomax.    Follow-up  The patient will follow up in 6 months. She may return sooner if need be.     Smoking Cessation Counseling:  Never a smoker.  Patient does not currently use any tobacco products.       Follow Up   Return in about 6 months (around 9/4/2024) for Next scheduled follow up, DYSURIA*FREQUENCY/RECURRENT UTI/DYSURIA/DETRUSSOR INSTABILITY.    Patient was given instructions and counseling regarding her condition or for health maintenance advice. Please see specific information pulled into the AVS if appropriate.          This document has been electronically signed by Griselda Cheng-Akwa, APRN   March 4, 2024 11:19 EST      Dictated Utilizing Dragon Dictation: Part of this note may be an electronic transcription/translation of spoken language to printed text using the Dragon Dictation System.     Transcribed from ambient dictation for Griselda Cheng-Akwa, APRN by Leslie Coreas.  03/04/24   11:53 EST    Patient or patient representative verbalized consent to the visit recording.  I have personally performed the services described in this document as transcribed by the above individual, and it  is both accurate and complete.

## 2024-03-05 ENCOUNTER — OFFICE VISIT (OUTPATIENT)
Dept: PULMONOLOGY | Facility: CLINIC | Age: 64
End: 2024-03-05
Payer: COMMERCIAL

## 2024-03-05 VITALS
OXYGEN SATURATION: 98 % | WEIGHT: 140 LBS | SYSTOLIC BLOOD PRESSURE: 122 MMHG | HEART RATE: 80 BPM | TEMPERATURE: 98 F | DIASTOLIC BLOOD PRESSURE: 76 MMHG | HEIGHT: 66 IN | BODY MASS INDEX: 22.5 KG/M2

## 2024-03-05 DIAGNOSIS — G47.33 OSA (OBSTRUCTIVE SLEEP APNEA): Primary | ICD-10-CM

## 2024-03-05 LAB — BACTERIA SPEC AEROBE CULT: NO GROWTH

## 2024-03-05 PROCEDURE — 99213 OFFICE O/P EST LOW 20 MIN: CPT | Performed by: NURSE PRACTITIONER

## 2024-03-05 NOTE — PROGRESS NOTES
"Chief Complaint  Sleep Apnea (HST IN MEDIA)    Subjective        Annmarie Hopkins presents to [facility]ICINE  History of Present Illness    Ms. Hopkins is a 63 year old female with a medical history significant for atrial fibrillation, arthritis, an sleep apnea.    She presents for evaluation of sleep apnea.  She underwent a home sleep study which was notable for mild sleep apnea.  She states that she does snore during sleep and has daytime fatigue.  She also tells me that she works third shift so this contributes to her fatigue.    Objective   Vital Signs:  /76   Pulse 80   Temp 98 °F (36.7 °C)   Ht 167.6 cm (66\")   Wt 63.5 kg (140 lb)   SpO2 98%   BMI 22.60 kg/m²   Estimated body mass index is 22.6 kg/m² as calculated from the following:    Height as of this encounter: 167.6 cm (66\").    Weight as of this encounter: 63.5 kg (140 lb).       BMI is within normal parameters. No other follow-up for BMI required.      Physical Exam     GENERAL APPEARANCE: Well developed, well nourished, alert and cooperative, and appears to be in no acute distress.    HEAD: normocephalic. Atraumatic.    EYES: PERRL, EOMI. Vision is grossly intact.    THROAT: Oral cavity and pharynx normal. No inflammation, swelling, exudate, or lesions.     NECK: Neck supple.  No thyromegaly.    CARDIAC: Normal S1 and S2. No S3, S4 or murmurs. Rhythm is regular.     RESPIRATORY:Bilateral air entry positive. Bilateral diminished breath sounds. No wheezing, crackles or rhonchi noted.    GI: Positive bowel sounds. Soft, nondistended, nontender.     MUSCULOSKELETAL: No significant deformity or joint abnormality. No edema. Peripheral pulses intact. No varicosities.    NEUROLOGICAL: Strength and sensation symmetric and intact throughout.     PSYCHIATRIC: The mental examination revealed the patient was oriented to person, place, and time.       Result Review :    The following data was reviewed by: " Ayana Smith, APRN on 03/05/2024:  Common labs          8/26/2023    11:36 11/19/2023    15:21 1/25/2024    10:59   Common Labs   Glucose 122  131  97    BUN 7  13  12    Creatinine 0.80  0.71  0.70    Sodium 139  137  143    Potassium 3.4  2.8  3.8    Chloride 102  99  107    Calcium 9.5  9.3  8.9    Albumin 4.3      Total Bilirubin 0.6      Alkaline Phosphatase 103      AST (SGOT) 16      ALT (SGPT) 16      WBC 6.45  7.67     Hemoglobin 14.8  14.7     Hematocrit 44.3  42.8     Platelets 216  294                    Assessment and Plan     Diagnoses and all orders for this visit:    1. THUY (obstructive sleep apnea) (Primary)  -     Detailed AutoPAP Order        Sleep study shows mild sleep apnea.  Will start her on an autopap.     Patient was educated on positive airway pressure treatment.  As per CMS guidelines, more than 4 hours on 70% of observed nights is considered adherence. Patient was strongly encouraged to use CPAP as much as possible during sleep as more CPAP use is equal to more benefit. Use of heated humidification in positive airway pressure treatment to improve the adherence to the device.  In case of claustrophobia, we will provide the patient cognitive behavioral therapy and desensitization. Oral appliances use will be discussed with the patient in case of mild to moderate sleep apnea or if the patient with severe disease fail positive airway pressure treatment.       The patient was extensively educated on the consequences of untreated obstructive sleep apnea namely cardiovascular/metabolic disorder, neurocognitive deficit, daytime sleepiness, motor vehicle accidents, depression, mood disorders and reduced quality of life.  At the end of conversation, the patient voices understanding of the disease process and treatment modality.  Patient also understands the risk of untreated obstructive sleep apnea and benefit benefits of the treatment.    Counseling time was greater than 10  minutes.      Follow Up     Return in about 3 months (around 6/5/2024).  Patient was given instructions and counseling regarding her condition or for health maintenance advice. Please see specific information pulled into the AVS if appropriate.

## 2024-03-06 ENCOUNTER — TELEPHONE (OUTPATIENT)
Dept: UROLOGY | Facility: CLINIC | Age: 64
End: 2024-03-06
Payer: COMMERCIAL

## 2024-03-06 NOTE — TELEPHONE ENCOUNTER
I called pt with negative urine culture pt verbalized understanding.         ----- Message from Griselda Cheng-Akwa, APRN sent at 3/5/2024 12:27 PM EST -----  Please kindly let patient know her urine culture results are negative for any bacterial infection at this time.    Urine Culture - No growth    However she may drop off another urine sample if she feels symptomatic, if not increase p.o. fluid intake, at least 1 to 2 L daily.  Avoid bladder irritants such as citrusy, spicy, caffeine products.      Follow-up in clinic as discussed.    Thank you

## 2024-03-11 ENCOUNTER — HOSPITAL ENCOUNTER (OUTPATIENT)
Dept: MAMMOGRAPHY | Facility: HOSPITAL | Age: 64
Discharge: HOME | End: 2024-03-11
Payer: COMMERCIAL

## 2024-03-11 ENCOUNTER — HOSPITAL ENCOUNTER (OUTPATIENT)
Dept: GENERAL RADIOLOGY | Facility: HOSPITAL | Age: 64
Discharge: HOME | End: 2024-03-11
Payer: COMMERCIAL

## 2024-03-11 ENCOUNTER — TRANSCRIBE ORDERS (OUTPATIENT)
Dept: ADMINISTRATIVE | Facility: HOSPITAL | Age: 64
End: 2024-03-11
Payer: COMMERCIAL

## 2024-03-11 DIAGNOSIS — R07.9 CHEST PAIN, UNSPECIFIED TYPE: Primary | ICD-10-CM

## 2024-03-11 DIAGNOSIS — R07.9 CHEST PAIN, UNSPECIFIED TYPE: ICD-10-CM

## 2024-03-11 DIAGNOSIS — Z12.31 VISIT FOR SCREENING MAMMOGRAM: ICD-10-CM

## 2024-03-11 PROCEDURE — 77063 BREAST TOMOSYNTHESIS BI: CPT

## 2024-03-11 PROCEDURE — 77063 BREAST TOMOSYNTHESIS BI: CPT | Performed by: RADIOLOGY

## 2024-03-11 PROCEDURE — 71046 X-RAY EXAM CHEST 2 VIEWS: CPT

## 2024-03-11 PROCEDURE — 77067 SCR MAMMO BI INCL CAD: CPT | Performed by: RADIOLOGY

## 2024-03-11 PROCEDURE — 77067 SCR MAMMO BI INCL CAD: CPT

## 2024-03-11 PROCEDURE — 71046 X-RAY EXAM CHEST 2 VIEWS: CPT | Performed by: RADIOLOGY

## 2024-03-13 ENCOUNTER — TELEPHONE (OUTPATIENT)
Dept: PULMONOLOGY | Facility: CLINIC | Age: 64
End: 2024-03-13
Payer: COMMERCIAL

## 2024-03-13 NOTE — TELEPHONE ENCOUNTER
----- Message from REBECCA Titus sent at 3/13/2024 10:47 AM EDT -----  She will need to find a dentist that does this.  It also is usually not covered on insurance.  ----- Message -----  From: Radha Win MA  Sent: 3/13/2024  10:45 AM EDT  To: REBECCA Titus    She said she would like to try a dental device.  ----- Message -----  From: Ayana Smith APRN  Sent: 3/13/2024   9:55 AM EDT  To: Radha Win MA    A dental device or we can try oxygen'  ----- Message -----  From: Radha Win MA  Sent: 3/13/2024   9:12 AM EDT  To: REBECCA Titus    Pt called regarding her PAP device. She said she is unable to tolerate the air blowing in her nose. She can't sleep and feels like its a hand over her mouth when the air blows. She has tried a full face mask and nasal only mask both. She wants to know if you have any recommendations.

## 2024-03-13 NOTE — TELEPHONE ENCOUNTER
"Relay     \"Pt will need to seek care outside of our office for a dental device due to sleep apnea, please be advised dental devices are not normally covered by insurance.\"                 "

## 2024-03-18 ENCOUNTER — OFFICE VISIT (OUTPATIENT)
Dept: CARDIOLOGY | Facility: CLINIC | Age: 64
End: 2024-03-18
Payer: COMMERCIAL

## 2024-03-18 ENCOUNTER — TELEPHONE (OUTPATIENT)
Dept: PULMONOLOGY | Facility: CLINIC | Age: 64
End: 2024-03-18
Payer: COMMERCIAL

## 2024-03-18 VITALS
HEART RATE: 74 BPM | WEIGHT: 139.2 LBS | HEIGHT: 66 IN | OXYGEN SATURATION: 100 % | DIASTOLIC BLOOD PRESSURE: 72 MMHG | BODY MASS INDEX: 22.37 KG/M2 | SYSTOLIC BLOOD PRESSURE: 116 MMHG

## 2024-03-18 DIAGNOSIS — I48.0 PAROXYSMAL ATRIAL FIBRILLATION: ICD-10-CM

## 2024-03-18 DIAGNOSIS — R07.2 PRECORDIAL PAIN: Primary | ICD-10-CM

## 2024-03-18 DIAGNOSIS — G47.33 OSA (OBSTRUCTIVE SLEEP APNEA): ICD-10-CM

## 2024-03-18 DIAGNOSIS — E78.5 DYSLIPIDEMIA: ICD-10-CM

## 2024-03-18 PROBLEM — I25.10 ASCVD (ARTERIOSCLEROTIC CARDIOVASCULAR DISEASE): Status: ACTIVE | Noted: 2024-03-18

## 2024-03-18 PROCEDURE — 99214 OFFICE O/P EST MOD 30 MIN: CPT | Performed by: NURSE PRACTITIONER

## 2024-03-18 RX ORDER — SODIUM CHLORIDE 0.9 % (FLUSH) 0.9 %
10 SYRINGE (ML) INJECTION AS NEEDED
OUTPATIENT
Start: 2024-03-18

## 2024-03-18 RX ORDER — METOPROLOL TARTRATE 50 MG/1
50 TABLET, FILM COATED ORAL AS NEEDED
Qty: 3 TABLET | Refills: 1 | Status: SHIPPED | OUTPATIENT
Start: 2024-03-18

## 2024-03-18 RX ORDER — SODIUM CHLORIDE 0.9 % (FLUSH) 0.9 %
10 SYRINGE (ML) INJECTION EVERY 12 HOURS SCHEDULED
OUTPATIENT
Start: 2024-03-18

## 2024-03-18 RX ORDER — SODIUM CHLORIDE 9 MG/ML
40 INJECTION, SOLUTION INTRAVENOUS AS NEEDED
OUTPATIENT
Start: 2024-03-18

## 2024-03-18 RX ORDER — LIDOCAINE HYDROCHLORIDE 10 MG/ML
0.5 INJECTION, SOLUTION INFILTRATION; PERINEURAL ONCE AS NEEDED
OUTPATIENT
Start: 2024-03-18

## 2024-03-18 RX ORDER — ASPIRIN 81 MG/1
81 TABLET ORAL DAILY
COMMUNITY

## 2024-03-18 RX ORDER — METOPROLOL TARTRATE 50 MG/1
TABLET, FILM COATED ORAL
Qty: 4 TABLET | Refills: 0 | Status: SHIPPED | OUTPATIENT
Start: 2024-03-18 | End: 2024-03-18

## 2024-03-18 NOTE — PROGRESS NOTES
[facility]             [place]rsulaREBECCA [place], [place]  Annmarie Hopkins  1960  03/18/2024    Patient Active Problem List   Diagnosis    Dysuria-frequency syndrome    Bladder spasms    Retention of urine    Paroxysmal atrial fibrillation    Atherosclerosis of abdominal aorta    Dyslipidemia    THUY (obstructive sleep apnea)    Syncope and collapse    Shortness of breath    Chronic bladder pain    ASCVD (arteriosclerotic cardiovascular disease)       Dear Hakan Jordan PA:    Subjective     Chief Complaint   Patient presents with    Follow-up     CTA RESULTS       HPI:     This is a 63 y.o. female with known past medical history of paroxysmal atrial fibrillation, dyslipidemia, obstructive sleep apnea.     Annmraie Hopkins presents today for routine cardiology follow up.  Patient states since her last visit she had episode of chest pain that he describes as sharp in nature in the middle of her chest.  She did see her PCP who obtained EKG and chest x-ray.  Per her report EKG was normal and chest x-ray did not show any acute findings.  There is a family history of premature coronary artery disease.  Has been tolerating her cholesterol medication.  Blood pressure controlled.  Had a CT calcium score of 338.    Diagnostic Testing  Cardiac event monitor 12/2023: Predominant normal sinus rhythm with episodes of sinus bradycardia and rare PVCs  Echocardiogram 1/2023: EF 61 to 65%  Nuclear stress test 1/2023: No evidence of ischemia  CT calcium score 2/2024: 338     All other systems were reviewed and were negative.    Patient Active Problem List   Diagnosis    Dysuria-frequency syndrome    Bladder spasms    Retention of urine    Paroxysmal atrial fibrillation    Atherosclerosis of abdominal aorta    Dyslipidemia    THUY (obstructive sleep apnea)    Syncope and collapse    Shortness of breath    Chronic bladder pain    ASCVD (arteriosclerotic cardiovascular  disease)       family history includes Breast cancer in her paternal cousin; Cancer in her mother; Diabetes in her father; Heart disease in her father and paternal grandfather; Hypertension in her father.     reports that she has never smoked. She has never been exposed to tobacco smoke. She has never used smokeless tobacco. She reports that she does not drink alcohol and does not use drugs.    Allergies   Allergen Reactions    Codeine Unknown - Low Severity and Shortness Of Breath         Current Outpatient Medications:     alendronate (FOSAMAX) 70 MG tablet, Take 1 tablet by mouth Every 7 (Seven) Days., Disp: , Rfl:     ascorbic acid (VITAMIN C) 500 MG tablet, Take 1 tablet by mouth Daily., Disp: , Rfl:     Calcium Carb-Cholecalciferol (CALCIUM 600+D3) 600-200 MG-UNIT tablet, Take  by mouth., Disp: , Rfl:     clobetasol (TEMOVATE) 0.05 % ointment, , Disp: , Rfl:     Diclofenac Sodium (VOLTAREN) 1 % gel gel, , Disp: , Rfl:     levocetirizine (XYZAL) 5 MG tablet, TAKE 1 2 TO 1 TABLET BY MOUTH EVERY EVENING, Disp: , Rfl: 5    levothyroxine (SYNTHROID, LEVOTHROID) 88 MCG tablet, , Disp: , Rfl:     montelukast (SINGULAIR) 10 MG tablet, Take 1 tablet by mouth Daily., Disp: , Rfl:     multivitamin (MULTI-VITAMIN DAILY PO), Take  by mouth Daily., Disp: , Rfl:     rivaroxaban (XARELTO) 20 MG tablet, Take 1 tablet by mouth Daily., Disp: 90 tablet, Rfl: 1    rosuvastatin (CRESTOR) 20 MG tablet, Take 1 tablet by mouth Daily., Disp: 30 tablet, Rfl: 3    tamsulosin (FLOMAX) 0.4 MG capsule 24 hr capsule, Take 1 capsule by mouth Daily for 90 days., Disp: 90 capsule, Rfl: 3    Turmeric 500 MG capsule, Take  by mouth., Disp: , Rfl:     aspirin 81 MG EC tablet, Take 1 tablet by mouth Daily., Disp: , Rfl:     metoprolol tartrate (LOPRESSOR) 50 MG tablet, Take 1 tablet by mouth As Needed (Heart rate). Take metoprolol the night before and 1 hour prior to CT scan.  Take 1 tablet if heart rate <70 bpm.  Take 2 tablets if heart rate > 70  bpm, Disp: 3 tablet, Rfl: 1      Physical Exam:  I have reviewed the patient's current vital signs as documented in the patient's EMR.   Vitals:    03/18/24 0842   BP: 116/72   Pulse: 74   SpO2: 100%     Body mass index is 22.47 kg/m².       03/18/24  0842   Weight: 63.1 kg (139 lb 3.2 oz)      Physical Exam  Constitutional:       General: She is not in acute distress.     Appearance: Normal appearance. She is well-developed and normal weight.   HENT:      Head: Normocephalic and atraumatic.   Eyes:      General: Lids are normal.      Conjunctiva/sclera: Conjunctivae normal.      Pupils: Pupils are equal, round, and reactive to light.   Neck:      Vascular: No carotid bruit or JVD.   Cardiovascular:      Rate and Rhythm: Normal rate and regular rhythm.      Pulses: Normal pulses.      Heart sounds: Normal heart sounds, S1 normal and S2 normal. No murmur heard.  Pulmonary:      Effort: Pulmonary effort is normal. No respiratory distress.      Breath sounds: Normal breath sounds. No wheezing.   Abdominal:      General: Bowel sounds are normal. There is no distension.      Palpations: Abdomen is soft. There is no hepatomegaly or splenomegaly.      Tenderness: There is no abdominal tenderness.   Musculoskeletal:         General: No swelling. Normal range of motion.      Cervical back: Normal range of motion and neck supple.      Right lower leg: No edema.      Left lower leg: No edema.   Skin:     General: Skin is warm and dry.      Coloration: Skin is not jaundiced.      Findings: No rash.   Neurological:      General: No focal deficit present.      Mental Status: She is alert and oriented to person, place, and time. Mental status is at baseline.   Psychiatric:         Mood and Affect: Mood normal.         Speech: Speech normal.         Behavior: Behavior normal.         Thought Content: Thought content normal.         Judgment: Judgment normal.            DATA REVIEWED:     TTE/HUBERT:  Results for orders placed during  "the hospital encounter of 01/26/24    Adult Transthoracic Echo Complete w/ Color, Spectral and Contrast if necessary per protocol    Interpretation Summary    Left ventricular systolic function is normal. Left ventricular ejection fraction appears to be 61 - 65%.    Left ventricular diastolic function is consistent with (grade I) impaired relaxation.    Estimated right ventricular systolic pressure from tricuspid regurgitation is normal (<35 mmHg).      Laboratory evaluations:    Lab Results   Component Value Date    GLUCOSE 97 01/25/2024    BUN 12 01/25/2024    CREATININE 0.70 01/25/2024    BCR 17.1 01/25/2024    K 3.8 01/25/2024    CO2 23.7 01/25/2024    CALCIUM 8.9 01/25/2024    ALBUMIN 4.3 08/26/2023    LABIL2 1.7 07/31/2014    AST 16 08/26/2023    ALT 16 08/26/2023     Lab Results   Component Value Date    WBC 7.67 11/19/2023    HGB 14.7 11/19/2023    HCT 42.8 11/19/2023    MCV 88.1 11/19/2023     11/19/2023     No results found for: \"CHOL\", \"CHLPL\", \"TRIG\", \"HDL\", \"LDL\", \"LDLDIRECT\"  No results found for: \"TSH\", \"C9FEHCW\", \"P8VCXUH\", \"THYROIDAB\"  No results found for: \"HGBA1C\"  Lab Results   Component Value Date    ALT 16 08/26/2023     No results found for: \"HGBA1C\"  Lab Results   Component Value Date    CREATININE 0.70 01/25/2024     No results found for: \"IRON\", \"TIBC\", \"FERRITIN\"  Lab Results   Component Value Date    INR <0.90 04/03/2014    PROTIME 9.9 04/03/2014           --------------------------------------------------------------------------------------------------------------------------    ASSESSMENT/PLAN:      Diagnosis Plan   1. Precordial pain  Obtain Informed Consent - Computed Tomography Angiography of Chest - Angiogram of Coronary Arteries    Vital Signs Upon Arrival    Cardiac Monitoring    Notify CT After Administration of metoprolol tartrate (LOPRESSOR) tablet    No Caffeine or Nicotine 4 Hours Prior to CTA    Verify NPO Status - Patient to Be NPO at Least 4 Hours Prior to CTA    " NPO Diet NPO Type: Strict NPO    Notify Provider If Total Metoprolol Given Equals 300mg & Heart Rate Not At Goal    Notify Provider Prior to Administration of Nitroglycerin if Patient SBP <80    Do Not Take Phosphodiasterase Inhibitors in the 72 Hours Prior to Coronary CTA    Insert Peripheral IV    Saline Lock & Maintain IV Access    sodium chloride 0.9 % flush 10 mL    sodium chloride 0.9 % flush 10 mL    sodium chloride 0.9 % infusion 40 mL    lidocaine (XYLOCAINE) 1 % injection 0.5 mL    Vital Signs - See Instructions    Hold Medication Metformin (Glucophage, Glucophage XR, Fortament, Glumetza);  Metglip (metformin/glipizide);  Glucovance (metformin/glyburide); Avandamet (metformin/rosiglitazone)    Monitor Patient Post Procedure for Signs / Symptoms of Reaction to Contrast    Patient May Discharge Home 15 Minutes After Procedure Complete (If Stable) - Verify Patient has  if Concerns for Side Effects of Metoprolol Are Present    CT Angiogram Coronary    CT Angiogram Coronary-Cardiology Interpretation    POC Creatinine    metoprolol tartrate (LOPRESSOR) 50 MG tablet      2. Dyslipidemia  metoprolol tartrate (LOPRESSOR) 50 MG tablet    Lipid Panel      3. THUY (obstructive sleep apnea)        4. Paroxysmal atrial fibrillation            Chest pain  ASCVD  Patient had CT cardiac calcium score of 338.  Has intermittent chest pain since last visit.  Family history of premature coronary artery disease.  We will obtain CT coronary angiogram to evaluate further.  Start baby aspirin.  Patient instructed to Take metoprolol the night before and 1 hour prior to CT scan.  Take 1 tablet if heart rate <70 bpm.  Take 2 tablets if heart rate > 70 bpm     Dyslipidemia  Patient had LDL of 150 at her last visit and was started on Crestor 20 mg.  Increase Crestor to 40 mg and recheck lipid panel prior to next visit.  If she is unable to tolerate high-dose statin we will consider PCSK9.  LDL goal of less than 70.    4.   Paroxysmal atrial fibrillation  Maintaining normal sinus rhythm.  Continue with Xarelto for stroke prevention.    5.  THUY  Was referred to pulmonology for which she was set up with CPAP however states she is having trouble tolerating the mask.  Working with pulmonology about this.8      This document has been @Electronically signed by REBECCA Begum, 03/18/24, 8:20 AM EDT.       Dictated Utilizing Dragon Dictation: Part of this note may be an electronic transcription/translation of spoken language to printed text using the Dragon Dictation System.    Follow-up appointment and medication changes provided in hand delivered After Visit Summary as well as reviewed in the room.

## 2024-03-18 NOTE — TELEPHONE ENCOUNTER
----- Message from REBECCA Titus sent at 3/18/2024  1:09 PM EDT -----  It does look like she might.  We can see her and work her up for COPD.  ----- Message -----  From: Radha Win MA  Sent: 3/18/2024  12:42 PM EDT  To: REBECCA Titus    Pt had a FU with Ursula Marlow. Her CT calcium from 2/26, Ursula said it looks like she might have emphysema. She wants you to take a look at it.

## 2024-03-19 ENCOUNTER — OFFICE VISIT (OUTPATIENT)
Dept: PULMONOLOGY | Facility: CLINIC | Age: 64
End: 2024-03-19
Payer: COMMERCIAL

## 2024-03-19 VITALS
WEIGHT: 139 LBS | TEMPERATURE: 97.1 F | BODY MASS INDEX: 22.34 KG/M2 | OXYGEN SATURATION: 98 % | DIASTOLIC BLOOD PRESSURE: 92 MMHG | HEIGHT: 66 IN | SYSTOLIC BLOOD PRESSURE: 142 MMHG | HEART RATE: 77 BPM

## 2024-03-19 DIAGNOSIS — R06.02 SHORTNESS OF BREATH: Primary | ICD-10-CM

## 2024-03-19 DIAGNOSIS — G47.33 OSA (OBSTRUCTIVE SLEEP APNEA): ICD-10-CM

## 2024-03-19 PROCEDURE — 99214 OFFICE O/P EST MOD 30 MIN: CPT | Performed by: NURSE PRACTITIONER

## 2024-03-19 RX ORDER — TRIAMCINOLONE ACETONIDE 5 MG/G
CREAM TOPICAL
COMMUNITY
Start: 2024-03-06

## 2024-03-19 NOTE — PROGRESS NOTES
"Chief Complaint  Emphysema (ABN CHEST IMAGING ) and Sleep Apnea (Unable to tolerate device )    Subjective        Annmarie Hopkins presents to [facility]  History of Present Illness    Ms. Hopkins is a 63 year old female with a medical history significant for arthritis, hypothyroidism, ASCVD, hyperlipidemia, and THUY.    She presents today for follow up on sleep apnea and evaluation of abnormal imaging.  She states that she is claustrophobic and is having difficulty using the cpap mask. She states that she did change to a nasal and mask is going to try that.  She underwent a CT chest for cardiology and it was noted that she had centrilobular emphysema.  She states that she does have some mild shortness of breath with exertion.  She denies any cough.  She has no exposures with work or otherwise.  She  is a life long non smoker.          Objective   Vital Signs:  /92   Pulse 77   Temp 97.1 °F (36.2 °C)   Ht 167.6 cm (66\")   Wt 63 kg (139 lb)   SpO2 98%   BMI 22.44 kg/m²   Estimated body mass index is 22.44 kg/m² as calculated from the following:    Height as of this encounter: 167.6 cm (66\").    Weight as of this encounter: 63 kg (139 lb).       BMI is within normal parameters. No other follow-up for BMI required.      Physical Exam       GENERAL APPEARANCE: Well developed, well nourished, alert and cooperative, and appears to be in no acute distress.    HEAD: normocephalic. Atraumatic.    EYES: PERRL, EOMI. Vision is grossly intact.    THROAT: Oral cavity and pharynx normal. No inflammation, swelling, exudate, or lesions.     NECK: Neck supple.  No thyromegaly.    CARDIAC: Normal S1 and S2. No S3, S4 or murmurs. Rhythm is regular.     RESPIRATORY:Bilateral air entry positive. Bilateral diminished breath sounds. No wheezing, crackles or rhonchi noted.    GI: Positive bowel sounds. Soft, nondistended, nontender.     MUSCULOSKELETAL: No significant " deformity or joint abnormality. No edema. Peripheral pulses intact. No varicosities.    NEUROLOGICAL: Strength and sensation symmetric and intact throughout.     PSYCHIATRIC: The mental examination revealed the patient was oriented to person, place, and time.     Result Review :    The following data was reviewed by: REBECCA Titus on 03/19/2024:  Common labs          8/26/2023    11:36 11/19/2023    15:21 1/25/2024    10:59   Common Labs   Glucose 122  131  97    BUN 7  13  12    Creatinine 0.80  0.71  0.70    Sodium 139  137  143    Potassium 3.4  2.8  3.8    Chloride 102  99  107    Calcium 9.5  9.3  8.9    Albumin 4.3      Total Bilirubin 0.6      Alkaline Phosphatase 103      AST (SGOT) 16      ALT (SGPT) 16      WBC 6.45  7.67     Hemoglobin 14.8  14.7     Hematocrit 44.3  42.8     Platelets 216  294                  Assessment and Plan     Diagnoses and all orders for this visit:    1. Shortness of breath (Primary)  -     Complete PFT - Pre & Post Bronchodilator; Future    2. THUY (obstructive sleep apnea)        Ordered PFT to assess lung function.  Obtained alpha-1 antitrypsin screen.    Will continue use of cpap and follow up with this at her next visit.      Follow Up     Return in about 2 months (around 5/19/2024).  Patient was given instructions and counseling regarding her condition or for health maintenance advice. Please see specific information pulled into the AVS if appropriate.

## 2024-03-28 ENCOUNTER — TELEPHONE (OUTPATIENT)
Dept: CARDIOLOGY | Facility: CLINIC | Age: 64
End: 2024-03-28
Payer: COMMERCIAL

## 2024-03-28 ENCOUNTER — TELEPHONE (OUTPATIENT)
Dept: PULMONOLOGY | Facility: CLINIC | Age: 64
End: 2024-03-28

## 2024-03-28 DIAGNOSIS — E78.5 DYSLIPIDEMIA: Primary | ICD-10-CM

## 2024-03-28 DIAGNOSIS — I25.10 ASCVD (ARTERIOSCLEROTIC CARDIOVASCULAR DISEASE): ICD-10-CM

## 2024-03-28 NOTE — TELEPHONE ENCOUNTER
PT IS RETURNING CPAP TO [facility], SHE CAN NOT USE IT.   PLEASE CALL PT TO ADVISE OF STEPS TO TAKE.   [phone]

## 2024-03-28 NOTE — TELEPHONE ENCOUNTER
Patient would like her CTA moved to [place] to have it soon and if not than she would like to go to [facility]

## 2024-03-28 NOTE — TELEPHONE ENCOUNTER
Caller: Annmarie Hopkins    Relationship: Self    Best call back number: [phone]    What is the best time to reach you: ANYTIME    What was the call regarding: PATIENT STARTED THE INCREASED DOSE OF ROSUVASTATIN ON THE 18TH AND THIS TUESDAY SHE GOT SICK WITH NO WARNING, COMING OUT OF BOTH ENDS, AND HAS BODY ACHES AND HER CHEST FEELS SORE, LIKE SHE'S BEEN COUGHING A LOT. PATIENT WOULD LIKE TO KNOW IF SHE SHOULD GO BACK TO THE LOWER DOSE OF THE ROSUVASTATIN FOR A WEEK OR TWO.

## 2024-03-29 ENCOUNTER — TELEPHONE (OUTPATIENT)
Dept: CARDIOLOGY | Facility: CLINIC | Age: 64
End: 2024-03-29
Payer: COMMERCIAL

## 2024-03-29 NOTE — TELEPHONE ENCOUNTER
Received pre-cert from Robyn, called [facility], pre-cert not required. Reference #[id number], they will fax us a copy of this information as well. Pre-cert given back to Robyn.

## 2024-04-02 RX ORDER — ROSUVASTATIN CALCIUM 20 MG/1
20 TABLET, COATED ORAL DAILY
Qty: 60 TABLET | Refills: 3 | Status: SHIPPED | OUTPATIENT
Start: 2024-04-02 | End: 2024-04-08 | Stop reason: SDUPTHER

## 2024-04-02 NOTE — TELEPHONE ENCOUNTER
Caller: Annmarie Hopkins DUNCAN    Relationship: Self    Best call back number: [phone]    Requested Prescriptions:   Requested Prescriptions     Pending Prescriptions Disp Refills    rosuvastatin (CRESTOR) 20 MG tablet 30 tablet 3     Sig: Take 1 tablet by mouth Daily.        Pharmacy where request should be sent: [facility] [id number]  [place], [place] - [address] AT [facility] [address] [facility] [phone] [facility] [phone] FX     Last office visit with prescribing clinician: 3/18/2024   Last telemedicine visit with prescribing clinician: Visit date not found   Next office visit with prescribing clinician: 5/13/2024     Additional details provided by patient: PATIENT TAKES 40 MG AND PRESCRIPTION WILL NEED TO SAY 40 MG. PATIENT IS OUT.    Does the patient have less than a 3 day supply:  [x] Yes  [] No    Would you like a call back once the refill request has been completed: [] Yes [] No    If the office needs to give you a call back, can they leave a voicemail: [] Yes [] No    Jonny Fitzgerald Rep   04/02/24 10:06 EDT

## 2024-04-02 NOTE — TELEPHONE ENCOUNTER
Caller: Annmarie Hopkins     Relationship: SELF    Best call back number:[phone]    What is your medical concern? PATIENT WAS SICK LAST WEEK AND CALLED IN AND THOUGHT IT WAS MEDICATION. PATIENT STATES SHE NOW THINKS IF WAS JUST SOMETHING SHE ATE. PATIENT DOES NOT WANT TO GET THE SHOTS FOR CHOLESTEROL.  PATIENT WANTS TO STAY WITH TAKING THE 40 MG TABLETS.      How long has this issue been going on? 1 WEEK    Is your provider already aware of this issue? YES    Have you been treated for this issue? PATIENT WAS TOLD TO TAKE A PROBIOTIC BY HER PCP. AND THAT SEEMED HELP.

## 2024-04-05 ENCOUNTER — TELEPHONE (OUTPATIENT)
Dept: CARDIOLOGY | Facility: CLINIC | Age: 64
End: 2024-04-05
Payer: COMMERCIAL

## 2024-04-05 NOTE — TELEPHONE ENCOUNTER
Caller: Annmarie Hopkins    Relationship: Self    Best call back number: [phone]    What is the best time to reach you: ANY    Who are you requesting to speak with (clinical staff, provider,  specific staff member): CLINICAL      What was the call regarding: PT IS CALLING BECAUSE SHE WOULD LIKE TO START BACK ON THE ROSUVASTATIN 40MG, SHE BELIEVES SHE HAD A BUG DURING THE TRIAL OF THIS AND IT WASN'T ACTUALLY THE MEDICINE EFFECTING HER, SHE RECENTLY GOT A NEW SCRIPT AND IT WAS FOR 20MG. SHE CAN DO THE 20 TWICE A DAY, BUT WILL NEED A NEW SCRIPT FOR THE 40MG. SHE ALSO WANTS KOREY TO KNOW THAT SHE ISN'T GOING TO ATTEND THE LIPID CLINIC EITHER.

## 2024-04-08 RX ORDER — ROSUVASTATIN CALCIUM 40 MG/1
40 TABLET, COATED ORAL DAILY
Qty: 60 TABLET | Refills: 3 | Status: SHIPPED | OUTPATIENT
Start: 2024-04-08

## 2024-04-08 NOTE — TELEPHONE ENCOUNTER
Pt said she thought she could take the 40mg Crestor or go to the lipid clinic, so she wants to try the Crestor.

## 2024-04-11 ENCOUNTER — TELEPHONE (OUTPATIENT)
Dept: CARDIOLOGY | Facility: CLINIC | Age: 64
End: 2024-04-11
Payer: COMMERCIAL

## 2024-04-11 ENCOUNTER — TELEPHONE (OUTPATIENT)
Dept: PULMONOLOGY | Facility: CLINIC | Age: 64
End: 2024-04-11
Payer: COMMERCIAL

## 2024-04-11 DIAGNOSIS — R06.02 SHORTNESS OF BREATH: Primary | ICD-10-CM

## 2024-04-11 NOTE — TELEPHONE ENCOUNTER
Patient called and wants to know if Ursula can order a Lipid panel on her to have done in the morning?     If so can someone call her back about this?     Thanks!

## 2024-04-11 NOTE — TELEPHONE ENCOUNTER
Called pt and advised her that there is an order in her chart. She expressed understanding.     She is wanting to have CT done in [place]. They are needing a new order placed for that.     She also stated that when she woke up on Tuesday she had purple and red on eye. She went to the eye doctor and she stated it was due to blood thinner and that it work like a bruise and go away and it could take up to 2 weeks.

## 2024-04-12 ENCOUNTER — LAB (OUTPATIENT)
Dept: LAB | Facility: HOSPITAL | Age: 64
End: 2024-04-12
Payer: COMMERCIAL

## 2024-04-12 DIAGNOSIS — E78.5 DYSLIPIDEMIA: ICD-10-CM

## 2024-04-12 LAB
CHOLEST SERPL-MCNC: 117 MG/DL (ref 0–200)
HDLC SERPL-MCNC: 42 MG/DL (ref 40–60)
LDLC SERPL CALC-MCNC: 59 MG/DL (ref 0–100)
LDLC/HDLC SERPL: 1.4 {RATIO}
TRIGL SERPL-MCNC: 82 MG/DL (ref 0–150)
VLDLC SERPL-MCNC: 16 MG/DL (ref 5–40)

## 2024-04-12 PROCEDURE — 80061 LIPID PANEL: CPT

## 2024-04-12 PROCEDURE — 36415 COLL VENOUS BLD VENIPUNCTURE: CPT

## 2024-04-12 NOTE — TELEPHONE ENCOUNTER
Spoke with pt and advised her of Ursula's message. Pt states she has an appointment with the lipid clinic on 4/18 and did her labs today.

## 2024-04-15 ENCOUNTER — TELEPHONE (OUTPATIENT)
Dept: CARDIOLOGY | Facility: CLINIC | Age: 64
End: 2024-04-15

## 2024-04-17 ENCOUNTER — HOSPITAL ENCOUNTER (OUTPATIENT)
Dept: RESPIRATORY THERAPY | Facility: HOSPITAL | Age: 64
Discharge: HOME | End: 2024-04-17
Admitting: NURSE PRACTITIONER
Payer: COMMERCIAL

## 2024-04-17 DIAGNOSIS — R06.02 SHORTNESS OF BREATH: ICD-10-CM

## 2024-04-17 PROCEDURE — 94729 DIFFUSING CAPACITY: CPT

## 2024-04-17 PROCEDURE — 94726 PLETHYSMOGRAPHY LUNG VOLUMES: CPT

## 2024-04-17 PROCEDURE — 94640 AIRWAY INHALATION TREATMENT: CPT

## 2024-04-17 PROCEDURE — 94060 EVALUATION OF WHEEZING: CPT

## 2024-04-17 RX ORDER — ALBUTEROL SULFATE 2.5 MG/3ML
2.5 SOLUTION RESPIRATORY (INHALATION) ONCE
Status: COMPLETED | OUTPATIENT
Start: 2024-04-17 | End: 2024-04-17

## 2024-04-17 RX ADMIN — ALBUTEROL SULFATE 2.5 MG: 2.5 SOLUTION RESPIRATORY (INHALATION) at 14:29

## 2024-05-03 ENCOUNTER — TELEPHONE (OUTPATIENT)
Dept: CARDIOLOGY | Facility: CLINIC | Age: 64
End: 2024-05-03
Payer: COMMERCIAL

## 2024-05-03 NOTE — TELEPHONE ENCOUNTER
Spoke with pt about moving her appointment to after her CT is scheduled, but pt states she has one scheduled at  on 5/7.

## 2024-05-08 ENCOUNTER — OFFICE VISIT (OUTPATIENT)
Dept: PULMONOLOGY | Facility: CLINIC | Age: 64
End: 2024-05-08
Payer: COMMERCIAL

## 2024-05-08 VITALS
TEMPERATURE: 98.2 F | WEIGHT: 139 LBS | HEART RATE: 81 BPM | DIASTOLIC BLOOD PRESSURE: 78 MMHG | OXYGEN SATURATION: 96 % | BODY MASS INDEX: 22.34 KG/M2 | HEIGHT: 66 IN | SYSTOLIC BLOOD PRESSURE: 178 MMHG

## 2024-05-08 DIAGNOSIS — G47.33 OSA (OBSTRUCTIVE SLEEP APNEA): Primary | ICD-10-CM

## 2024-05-08 PROCEDURE — 99213 OFFICE O/P EST LOW 20 MIN: CPT | Performed by: NURSE PRACTITIONER

## 2024-05-09 ENCOUNTER — OFFICE VISIT (OUTPATIENT)
Dept: CARDIOLOGY | Facility: CLINIC | Age: 64
End: 2024-05-09
Payer: COMMERCIAL

## 2024-05-09 VITALS
HEART RATE: 78 BPM | DIASTOLIC BLOOD PRESSURE: 71 MMHG | OXYGEN SATURATION: 98 % | BODY MASS INDEX: 23.16 KG/M2 | HEIGHT: 65 IN | SYSTOLIC BLOOD PRESSURE: 129 MMHG | WEIGHT: 139 LBS

## 2024-05-09 DIAGNOSIS — E78.5 DYSLIPIDEMIA: ICD-10-CM

## 2024-05-09 DIAGNOSIS — I25.10 ASCVD (ARTERIOSCLEROTIC CARDIOVASCULAR DISEASE): Primary | ICD-10-CM

## 2024-05-09 DIAGNOSIS — I48.0 PAROXYSMAL ATRIAL FIBRILLATION: ICD-10-CM

## 2024-05-09 DIAGNOSIS — G47.33 OSA (OBSTRUCTIVE SLEEP APNEA): ICD-10-CM

## 2024-05-09 PROCEDURE — 99214 OFFICE O/P EST MOD 30 MIN: CPT | Performed by: NURSE PRACTITIONER

## 2024-05-09 RX ORDER — ROSUVASTATIN CALCIUM 40 MG/1
40 TABLET, COATED ORAL DAILY
Qty: 90 TABLET | Refills: 3 | Status: SHIPPED | OUTPATIENT
Start: 2024-05-09

## 2024-05-31 PROCEDURE — 99284 EMERGENCY DEPT VISIT MOD MDM: CPT

## 2024-05-31 PROCEDURE — 36415 COLL VENOUS BLD VENIPUNCTURE: CPT

## 2024-06-01 ENCOUNTER — APPOINTMENT (OUTPATIENT)
Dept: ULTRASOUND IMAGING | Facility: HOSPITAL | Age: 64
End: 2024-06-01
Payer: COMMERCIAL

## 2024-06-01 ENCOUNTER — HOSPITAL ENCOUNTER (EMERGENCY)
Facility: HOSPITAL | Age: 64
Discharge: HOME | End: 2024-06-01
Attending: STUDENT IN AN ORGANIZED HEALTH CARE EDUCATION/TRAINING PROGRAM
Payer: COMMERCIAL

## 2024-06-01 VITALS
OXYGEN SATURATION: 97 % | BODY MASS INDEX: 24.41 KG/M2 | HEIGHT: 64 IN | DIASTOLIC BLOOD PRESSURE: 83 MMHG | TEMPERATURE: 97.9 F | RESPIRATION RATE: 16 BRPM | SYSTOLIC BLOOD PRESSURE: 146 MMHG | HEART RATE: 108 BPM | WEIGHT: 143 LBS

## 2024-06-01 DIAGNOSIS — E87.6 HYPOKALEMIA: ICD-10-CM

## 2024-06-01 DIAGNOSIS — M79.89 LEFT LEG SWELLING: Primary | ICD-10-CM

## 2024-06-01 LAB
ALBUMIN SERPL-MCNC: 4.3 G/DL (ref 3.5–5.2)
ALBUMIN/GLOB SERPL: 1.8 G/DL
ALP SERPL-CCNC: 100 U/L (ref 39–117)
ALT SERPL W P-5'-P-CCNC: 24 U/L (ref 1–33)
ANION GAP SERPL CALCULATED.3IONS-SCNC: 10.1 MMOL/L (ref 5–15)
AST SERPL-CCNC: 21 U/L (ref 1–32)
BASOPHILS # BLD AUTO: 0.05 10*3/MM3 (ref 0–0.2)
BASOPHILS NFR BLD AUTO: 0.6 % (ref 0–1.5)
BILIRUB SERPL-MCNC: 0.5 MG/DL (ref 0–1.2)
BUN SERPL-MCNC: 5 MG/DL (ref 8–23)
BUN/CREAT SERPL: 6.2 (ref 7–25)
CALCIUM SPEC-SCNC: 9.1 MG/DL (ref 8.6–10.5)
CHLORIDE SERPL-SCNC: 103 MMOL/L (ref 98–107)
CO2 SERPL-SCNC: 24.9 MMOL/L (ref 22–29)
CREAT SERPL-MCNC: 0.81 MG/DL (ref 0.57–1)
DEPRECATED RDW RBC AUTO: 40.9 FL (ref 37–54)
EGFRCR SERPLBLD CKD-EPI 2021: 82.2 ML/MIN/1.73
EOSINOPHIL # BLD AUTO: 0.19 10*3/MM3 (ref 0–0.4)
EOSINOPHIL NFR BLD AUTO: 2.2 % (ref 0.3–6.2)
ERYTHROCYTE [DISTWIDTH] IN BLOOD BY AUTOMATED COUNT: 12.4 % (ref 12.3–15.4)
GLOBULIN UR ELPH-MCNC: 2.4 GM/DL
GLUCOSE SERPL-MCNC: 134 MG/DL (ref 65–99)
HCT VFR BLD AUTO: 40.5 % (ref 34–46.6)
HGB BLD-MCNC: 13.7 G/DL (ref 12–15.9)
IMM GRANULOCYTES # BLD AUTO: 0.02 10*3/MM3 (ref 0–0.05)
IMM GRANULOCYTES NFR BLD AUTO: 0.2 % (ref 0–0.5)
LYMPHOCYTES # BLD AUTO: 2.14 10*3/MM3 (ref 0.7–3.1)
LYMPHOCYTES NFR BLD AUTO: 25 % (ref 19.6–45.3)
MCH RBC QN AUTO: 30.6 PG (ref 26.6–33)
MCHC RBC AUTO-ENTMCNC: 33.8 G/DL (ref 31.5–35.7)
MCV RBC AUTO: 90.4 FL (ref 79–97)
MONOCYTES # BLD AUTO: 0.85 10*3/MM3 (ref 0.1–0.9)
MONOCYTES NFR BLD AUTO: 9.9 % (ref 5–12)
NEUTROPHILS NFR BLD AUTO: 5.3 10*3/MM3 (ref 1.7–7)
NEUTROPHILS NFR BLD AUTO: 62.1 % (ref 42.7–76)
NRBC BLD AUTO-RTO: 0 /100 WBC (ref 0–0.2)
PLATELET # BLD AUTO: 214 10*3/MM3 (ref 140–450)
PMV BLD AUTO: 10.2 FL (ref 6–12)
POTASSIUM SERPL-SCNC: 3.2 MMOL/L (ref 3.5–5.2)
PROT SERPL-MCNC: 6.7 G/DL (ref 6–8.5)
RBC # BLD AUTO: 4.48 10*6/MM3 (ref 3.77–5.28)
SODIUM SERPL-SCNC: 138 MMOL/L (ref 136–145)
WBC NRBC COR # BLD AUTO: 8.55 10*3/MM3 (ref 3.4–10.8)

## 2024-06-01 PROCEDURE — 80053 COMPREHEN METABOLIC PANEL: CPT | Performed by: STUDENT IN AN ORGANIZED HEALTH CARE EDUCATION/TRAINING PROGRAM

## 2024-06-01 PROCEDURE — 93971 EXTREMITY STUDY: CPT | Performed by: RADIOLOGY

## 2024-06-01 PROCEDURE — 36415 COLL VENOUS BLD VENIPUNCTURE: CPT

## 2024-06-01 PROCEDURE — 93971 EXTREMITY STUDY: CPT

## 2024-06-01 PROCEDURE — 85025 COMPLETE CBC W/AUTO DIFF WBC: CPT | Performed by: STUDENT IN AN ORGANIZED HEALTH CARE EDUCATION/TRAINING PROGRAM

## 2024-06-01 RX ORDER — POTASSIUM CHLORIDE 20 MEQ/1
40 TABLET, EXTENDED RELEASE ORAL ONCE
Status: COMPLETED | OUTPATIENT
Start: 2024-06-01 | End: 2024-06-01

## 2024-06-01 RX ORDER — POTASSIUM CHLORIDE 750 MG/1
10 TABLET, FILM COATED, EXTENDED RELEASE ORAL 2 TIMES DAILY
Qty: 60 TABLET | Refills: 0 | Status: SHIPPED | OUTPATIENT
Start: 2024-06-01 | End: 2024-07-01

## 2024-06-01 RX ORDER — ORPHENADRINE CITRATE 100 MG/1
100 TABLET, EXTENDED RELEASE ORAL ONCE
Status: COMPLETED | OUTPATIENT
Start: 2024-06-01 | End: 2024-06-01

## 2024-06-01 RX ADMIN — ORPHENADRINE CITRATE 100 MG: 100 TABLET, EXTENDED RELEASE ORAL at 03:12

## 2024-06-01 RX ADMIN — POTASSIUM CHLORIDE 40 MEQ: 1500 TABLET, EXTENDED RELEASE ORAL at 03:12

## 2024-06-01 NOTE — ED PROVIDER NOTES
"Subjective   History of Present Illness  Patient is a 63-year-old female with significant past medical history positive for arthritis, hypothyroidism, sleep apnea presenting to the ER for left leg swelling. Patient states, \"Last night I climbed up onto my [employer] bunk beds to change the sheet. I have been having pain in my left leg/foot and right foot ever since with worsening tonight.\"  Denies any known injury, previous injuries, redness, wounds, fever, any additional symptoms at this time.  Patient denies any alleviating or worsening factors.    History provided by:  Patient   used: No        Review of Systems   Constitutional: Negative.  Negative for fever.   HENT: Negative.     Respiratory: Negative.     Cardiovascular:  Positive for leg swelling. Negative for chest pain.   Gastrointestinal: Negative.  Negative for abdominal pain.   Endocrine: Negative.    Genitourinary: Negative.  Negative for dysuria.   Skin: Negative.    Neurological: Negative.    Psychiatric/Behavioral: Negative.     All other systems reviewed and are negative.      Past Medical History:   Diagnosis Date    Abdominal fibromatosis     Arthritis     Hypothyroid     Sleep apnea        Allergies   Allergen Reactions    Codeine Unknown - Low Severity and Shortness Of Breath       Past Surgical History:   Procedure Laterality Date    LAPAROSCOPIC TUBAL LIGATION      THYROIDECTOMY  2014       Family History   Problem Relation Age of Onset    Cancer Mother     Hypertension Father     Heart disease Father     Diabetes Father     Heart disease Paternal Grandfather     Breast cancer Paternal Cousin        Social History     Socioeconomic History    Marital status:    Tobacco Use    Smoking status: Never     Passive exposure: Never    Smokeless tobacco: Never   Vaping Use    Vaping status: Never Used   Substance and Sexual Activity    Alcohol use: Never    Drug use: Never    Sexual activity: Defer           Objective "   Physical Exam  Vitals and nursing note reviewed.   Constitutional:       General: She is not in acute distress.     Appearance: She is well-developed. She is not diaphoretic.   HENT:      Head: Normocephalic and atraumatic.      Right Ear: External ear normal.      Left Ear: External ear normal.      Nose: Nose normal.   Eyes:      Conjunctiva/sclera: Conjunctivae normal.      Pupils: Pupils are equal, round, and reactive to light.   Neck:      Vascular: No JVD.      Trachea: No tracheal deviation.   Cardiovascular:      Rate and Rhythm: Normal rate and regular rhythm.      Heart sounds: Normal heart sounds. No murmur heard.  Pulmonary:      Effort: Pulmonary effort is normal. No respiratory distress.      Breath sounds: Normal breath sounds. No wheezing.   Abdominal:      General: Bowel sounds are normal.      Palpations: Abdomen is soft.      Tenderness: There is no abdominal tenderness.   Musculoskeletal:         General: Swelling present. No deformity. Normal range of motion.      Cervical back: Normal range of motion and neck supple.   Skin:     General: Skin is warm and dry.      Coloration: Skin is not pale.      Findings: No erythema or rash.   Neurological:      Mental Status: She is alert and oriented to person, place, and time.      Cranial Nerves: No cranial nerve deficit.   Psychiatric:         Behavior: Behavior normal.         Thought Content: Thought content normal.         Procedures         Results for orders placed or performed during the hospital encounter of 06/01/24   Comprehensive Metabolic Panel    Specimen: Arm, Left; Blood   Result Value Ref Range    Glucose 134 (H) 65 - 99 mg/dL    BUN 5 (L) 8 - 23 mg/dL    Creatinine 0.81 0.57 - 1.00 mg/dL    Sodium 138 136 - 145 mmol/L    Potassium 3.2 (L) 3.5 - 5.2 mmol/L    Chloride 103 98 - 107 mmol/L    CO2 24.9 22.0 - 29.0 mmol/L    Calcium 9.1 8.6 - 10.5 mg/dL    Total Protein 6.7 6.0 - 8.5 g/dL    Albumin 4.3 3.5 - 5.2 g/dL    ALT (SGPT) 24 1 -  "33 U/L    AST (SGOT) 21 1 - 32 U/L    Alkaline Phosphatase 100 39 - 117 U/L    Total Bilirubin 0.5 0.0 - 1.2 mg/dL    Globulin 2.4 gm/dL    A/G Ratio 1.8 g/dL    BUN/Creatinine Ratio 6.2 (L) 7.0 - 25.0    Anion Gap 10.1 5.0 - 15.0 mmol/L    eGFR 82.2 >60.0 mL/min/1.73   CBC Auto Differential    Specimen: Arm, Left; Blood   Result Value Ref Range    WBC 8.55 3.40 - 10.80 10*3/mm3    RBC 4.48 3.77 - 5.28 10*6/mm3    Hemoglobin 13.7 12.0 - 15.9 g/dL    Hematocrit 40.5 34.0 - 46.6 %    MCV 90.4 79.0 - 97.0 fL    MCH 30.6 26.6 - 33.0 pg    MCHC 33.8 31.5 - 35.7 g/dL    RDW 12.4 12.3 - 15.4 %    RDW-SD 40.9 37.0 - 54.0 fl    MPV 10.2 6.0 - 12.0 fL    Platelets 214 140 - 450 10*3/mm3    Neutrophil % 62.1 42.7 - 76.0 %    Lymphocyte % 25.0 19.6 - 45.3 %    Monocyte % 9.9 5.0 - 12.0 %    Eosinophil % 2.2 0.3 - 6.2 %    Basophil % 0.6 0.0 - 1.5 %    Immature Grans % 0.2 0.0 - 0.5 %    Neutrophils, Absolute 5.30 1.70 - 7.00 10*3/mm3    Lymphocytes, Absolute 2.14 0.70 - 3.10 10*3/mm3    Monocytes, Absolute 0.85 0.10 - 0.90 10*3/mm3    Eosinophils, Absolute 0.19 0.00 - 0.40 10*3/mm3    Basophils, Absolute 0.05 0.00 - 0.20 10*3/mm3    Immature Grans, Absolute 0.02 0.00 - 0.05 10*3/mm3    nRBC 0.0 0.0 - 0.2 /100 WBC       US Venous Doppler Lower Extremity Left (duplex)   Final Result   Impression:       No evidence of DVT.       This report was finalized on 6/1/2024 2:16 AM by Puma Monterroso MD.              ED Course                                             Medical Decision Making  Patient is a 63-year-old female with significant past medical history positive for arthritis, hypothyroidism, sleep apnea presenting to the ER for left leg swelling. Patient states, \"Last night I climbed up onto my [employer] bunk beds to change the sheet. I have been having pain in my left leg/foot and right foot ever since with worsening tonight.\"  Denies any known injury, previous injuries, redness, wounds, fever, any additional symptoms at this time.  " Patient denies any alleviating or worsening factors.    Advised patient to return to the ER with new or worsening symptoms.  Advised patient to follow-up with PCP.  Patient verbalized understanding and agrees.  Vital signs are stable at discharge.  Patient is in no acute distress.    Problems Addressed:  Hypokalemia: complicated acute illness or injury  Left leg swelling: complicated acute illness or injury    Amount and/or Complexity of Data Reviewed  Labs: ordered.    Risk  Prescription drug management.        Final diagnoses:   Left leg swelling   Hypokalemia       ED Disposition  ED Disposition       ED Disposition   Discharge    Condition   Stable    Comment   --               Hakan Jordan PA  [address]  [place] [id number] [phone]    Schedule an appointment as soon as possible for a visit            Medication List        New Prescriptions      potassium chloride 10 MEQ CR tablet  Take 1 tablet by mouth 2 (Two) Times a Day for 30 days.               Where to Get Your Medications        These medications were sent to [facility] [id number] - [place], [place] - [address] [facility] [email or web address] AT [facility] [address] - [phone]  - [phone]   [address] [facility] [email or web address] [place] [address]      Phone: [phone]   potassium chloride 10 MEQ CR tablet            Andie Gonzalez, APRN  06/01/24 0500

## 2024-06-01 NOTE — ED NOTES
MEDICAL SCREENING:    Reason for Visit: Left lower extremity swelling    Patient initially seen in triage.  The patient was advised further evaluation and diagnostic testing will be needed, some of the treatment and testing will be initiated in the lobby in order to begin the process.  The patient will be returned to the waiting area for the time being and possibly be re-assessed by a subsequent ED provider.  The patient will be brought back to the treatment area in as timely manner as possible.       William Noriega,   05/31/24 4709

## 2024-08-19 ENCOUNTER — TELEPHONE (OUTPATIENT)
Dept: CARDIOLOGY | Facility: CLINIC | Age: 64
End: 2024-08-19
Payer: COMMERCIAL

## 2024-08-19 NOTE — TELEPHONE ENCOUNTER
Wants to know if she can double up on her Xarelto 15 mg she is on 20 and she is running out of the 20

## 2024-08-20 DIAGNOSIS — I48.0 PAROXYSMAL ATRIAL FIBRILLATION: ICD-10-CM

## 2024-09-13 ENCOUNTER — TRANSCRIBE ORDERS (OUTPATIENT)
Dept: ADMINISTRATIVE | Facility: HOSPITAL | Age: 64
End: 2024-09-13
Payer: COMMERCIAL

## 2024-09-13 ENCOUNTER — HOSPITAL ENCOUNTER (OUTPATIENT)
Dept: GENERAL RADIOLOGY | Facility: HOSPITAL | Age: 64
Discharge: HOME | End: 2024-09-13
Payer: COMMERCIAL

## 2024-09-13 DIAGNOSIS — M54.9 ACUTE BACK PAIN, UNSPECIFIED BACK LOCATION, UNSPECIFIED BACK PAIN LATERALITY: ICD-10-CM

## 2024-09-13 DIAGNOSIS — M54.9 ACUTE BACK PAIN, UNSPECIFIED BACK LOCATION, UNSPECIFIED BACK PAIN LATERALITY: Primary | ICD-10-CM

## 2024-09-13 PROCEDURE — 74018 RADEX ABDOMEN 1 VIEW: CPT

## 2024-10-01 ENCOUNTER — OFFICE VISIT (OUTPATIENT)
Dept: UROLOGY | Facility: CLINIC | Age: 64
End: 2024-10-01
Payer: COMMERCIAL

## 2024-10-01 VITALS
DIASTOLIC BLOOD PRESSURE: 84 MMHG | HEIGHT: 64 IN | WEIGHT: 133.6 LBS | HEART RATE: 68 BPM | SYSTOLIC BLOOD PRESSURE: 128 MMHG | BODY MASS INDEX: 22.81 KG/M2

## 2024-10-01 DIAGNOSIS — R10.9 BILATERAL FLANK PAIN: ICD-10-CM

## 2024-10-01 DIAGNOSIS — N20.0 BILATERAL NEPHROLITHIASIS: ICD-10-CM

## 2024-10-01 DIAGNOSIS — N30.01 ACUTE CYSTITIS WITH HEMATURIA: ICD-10-CM

## 2024-10-01 DIAGNOSIS — R31.0 GROSS HEMATURIA: ICD-10-CM

## 2024-10-01 DIAGNOSIS — R35.0 FREQUENCY OF MICTURITION: Primary | ICD-10-CM

## 2024-10-01 LAB
BILIRUB BLD-MCNC: NEGATIVE MG/DL
CLARITY, POC: CLEAR
COLOR UR: YELLOW
EXPIRATION DATE: ABNORMAL
GLUCOSE UR STRIP-MCNC: NEGATIVE MG/DL
KETONES UR QL: NEGATIVE
LEUKOCYTE EST, POC: NEGATIVE
Lab: ABNORMAL
NITRITE UR-MCNC: NEGATIVE MG/ML
PH UR: 6 [PH] (ref 5–8)
PROT UR STRIP-MCNC: NEGATIVE MG/DL
RBC # UR STRIP: ABNORMAL /UL
SP GR UR: 1.01 (ref 1–1.03)
UROBILINOGEN UR QL: NORMAL

## 2024-10-01 PROCEDURE — 87086 URINE CULTURE/COLONY COUNT: CPT | Performed by: NURSE PRACTITIONER

## 2024-10-01 RX ORDER — TAMSULOSIN HYDROCHLORIDE 0.4 MG/1
1 CAPSULE ORAL EVERY 24 HOURS
COMMUNITY

## 2024-10-01 NOTE — PROGRESS NOTES
Chief Complaint  Dysuria-frequency syndrome (6 MONTHS FOLLOW UP FOR GROSS HEMATURIA/KIDNEY STONES)    Subjective          Annmarie Hopkins presents to [facility] for DYSURIA-IMPROVED  History of Present Illness    The patient is a pleasant 64-year-old female who returns to clinic today for evaluation. This is a 6-month follow-up with prior concerns of urinary retention and dysuria, initially requiring Shaikh catheter-RESOLVED.  Today patient presents with concerns of gross hematuria, bilateral flank pain, and kidney stones.  She did have 1 episode of hematuria lasting 3 days for which she was evaluated by her PCP with concerns of urinary tract infection.  Nevertheless her urine culture was negative for any bacterial growth.   Urine Culture  No growth        On clinic evaluation today 10/1/2024 her urinalysis is complete negative for leukocyte esterase, it is negative for nitrite, it is negative for gross but show trace microscopic hematuria.  Her PVR is 0 mL.  Patient reports persistent flank pain right side greater than left side.     She did proceed with KUB showing bilateral nephrolithiasis, with questionable left distal ureteral stone.  Patient also did have large volume stool consistent with chronic idiopathic constipation. TODAY,   We spoke about the impact of this on bladder function.  We spoke about  its relationship to recurrent urinary tract infections, vaginal pain, pelvic/bladder pressure, back pain, flank pain she is experiencing.        When last evaluated in clinic on 03/05/2023, patient was 3 months post ER follow-up for urinary retention, during which she did have an indwelling Shaikh catheter in place, which were eventually discontinued after starting her on tamsulosin. Her postvoid residuals have been greater than 800 cc.     Patient also had a CT abdomen and pelvis completed, which was unremarkable with the cause of her urinary retention, not noted on  CT. For her plan of care, we had discussed continuing tamsulosin 0.4 mg daily. She has tolerated medication well and returns to clinic today for recheck.  Today she reports taking tamsulosin intermittently. She has had constipation issues for which we strongly recommend daily MiraLAX due to the correlation between severe constipation and urinary retention. Overall, she is doing remarkably better.    She reports significant weight loss since 2023 by avoiding fried foods, eating chicken and vegetables. The Shaikh catheter was painful, SHE desires to never have that replaced.. She has not had to self-catheterize. She denies any burning, frequency, or urgency. She inquires if she should continue taking the water pill. She takes it in the morning and sometimes before noon. She denies any swelling in her ankles. She denies any straining when she urinates. She was dehydrated, her potassium was low, and had bowel issues. She is not on blood pressure medication. She wears her night pad when she is out. She drinks decaf hot tea.    Her father had a double bypass in . Her mother passed away from a massive stroke. Her paternal grandfather  of a stroke at 62-year-old. Her older sister had a heart attack and a clotted artery.    She had an echocardiogram and stress test done, which she passed. She had a CT cardio calcium scan. Her total calcium score was 338. She has an appointment with her pulmonologist tomorrow.    FINDINGS:XR Abdomen KUB 24  2 views the abdomen Large stool burden No evidence of bowel obstruction   Tiny calcifications over both kidneys.  Active Ambulatory Problems     Diagnosis Date Noted    Dysuria-frequency syndrome 2023    Bladder spasms 2023    Retention of urine 2023    Paroxysmal atrial fibrillation 2023    Atherosclerosis of abdominal aorta 2023    Dyslipidemia 2023    THUY (obstructive sleep apnea) 2023    Syncope and collapse 2023     "Shortness of breath 11/27/2023    Chronic bladder pain 12/04/2023    ASCVD (arteriosclerotic cardiovascular disease) 03/18/2024    Gross hematuria 10/02/2024    Bilateral nephrolithiasis 10/02/2024    Frequency of micturition 10/02/2024    Bilateral flank pain 10/02/2024    Acute cystitis with hematuria 10/02/2024     Resolved Ambulatory Problems     Diagnosis Date Noted    No Resolved Ambulatory Problems     Past Medical History:   Diagnosis Date    Abdominal fibromatosis     Arthritis     Hypothyroid     Sleep apnea       Objective   Vital Signs:   /84   Pulse 68   Ht 162.6 cm (64.02\")   Wt 60.6 kg (133 lb 9.6 oz)   BMI 22.92 kg/m²       ROS:   Review of Systems   Constitutional:  Positive for activity change, appetite change and unexpected weight loss. Negative for diaphoresis, fatigue, fever and unexpected weight gain.   HENT:  Negative for congestion, ear discharge, ear pain, nosebleeds, rhinorrhea, sinus pressure and sore throat.    Eyes:  Negative for blurred vision, double vision, photophobia, pain, redness and visual disturbance.   Respiratory:  Negative for apnea, cough, chest tightness, shortness of breath, wheezing and stridor.    Cardiovascular:  Negative for chest pain and palpitations.   Gastrointestinal:  Positive for abdominal distention, abdominal pain and constipation. Negative for diarrhea, nausea and vomiting.   Endocrine: Negative for polydipsia, polyphagia and polyuria.   Genitourinary:  Positive for difficulty urinating, dysuria, flank pain, frequency, hematuria, pelvic pain, pelvic pressure and urgency. Negative for decreased urine volume and urinary incontinence.   Musculoskeletal:  Positive for back pain. Negative for arthralgias and joint swelling.   Skin:  Positive for dry skin and pallor. Negative for rash and wound.   Allergic/Immunologic: Negative for food allergies.   Neurological:  Negative for dizziness, tremors, syncope, weakness, light-headedness, headache, memory " problem and confusion.   Hematological:  Bruises/bleeds easily.   Psychiatric/Behavioral:  Positive for sleep disturbance and stress. Negative for behavioral problems. The patient is not nervous/anxious.         Physical Exam  Constitutional:       General: She is in acute distress.      Appearance: She is well-developed. She is ill-appearing.   HENT:      Head: Normocephalic and atraumatic.   Eyes:      Pupils: Pupils are equal, round, and reactive to light.   Neck:      Thyroid: No thyromegaly.      Trachea: No tracheal deviation.   Cardiovascular:      Rate and Rhythm: Normal rate and regular rhythm.      Heart sounds: No murmur heard.  Pulmonary:      Effort: Pulmonary effort is normal. No respiratory distress.      Breath sounds: Normal breath sounds. No stridor. No wheezing.   Abdominal:      General: Bowel sounds are normal. There is distension.      Palpations: Abdomen is soft.      Tenderness: There is abdominal tenderness.   Genitourinary:     Labia:         Right: No tenderness.         Left: No tenderness.       Vagina: Normal. No vaginal discharge.      Comments: Slow urinary stream, dysuria, urinary retention-improved  Musculoskeletal:         General: Tenderness present. No deformity. Normal range of motion.      Cervical back: Normal range of motion.   Skin:     General: Skin is warm and dry.      Capillary Refill: Capillary refill takes less than 2 seconds.      Coloration: Skin is not pale.      Findings: No erythema or rash.   Neurological:      Mental Status: She is alert and oriented to person, place, and time.      Cranial Nerves: No cranial nerve deficit.      Sensory: No sensory deficit.      Coordination: Coordination normal.   Psychiatric:         Behavior: Behavior normal.         Thought Content: Thought content normal.         Judgment: Judgment normal.        Result Review :     UA          12/4/2023    14:08 3/4/2024    10:25 10/1/2024    09:56   Urinalysis   Ketones, UA Negative   Negative  Negative    Leukocytes, UA Negative  Negative  Negative      Urine Culture          12/4/2023    14:08 3/4/2024    10:20   Urine Culture   Urine Culture No growth  No growth             Assessment and Plan    Problem List Items Addressed This Visit          Gastrointestinal Abdominal     Bilateral flank pain    Relevant Orders    CT Abdomen Pelvis Stone Protocol       Genitourinary and Reproductive     Gross hematuria    Relevant Orders    CT Abdomen Pelvis Stone Protocol    Bilateral nephrolithiasis    Relevant Orders    CT Abdomen Pelvis Stone Protocol    Frequency of micturition - Primary    Relevant Orders    POC Urinalysis Dipstick, Automated (Completed)    Urine Culture - Urine, Urine, Random Void    Acute cystitis with hematuria    Relevant Medications    tamsulosin (FLOMAX) 0.4 MG capsule 24 hr capsule    Other Relevant Orders    CT Abdomen Pelvis Stone Protocol                                               ASSESSMENT  GROSS HEMATURIA/KIDNEY STONES /FLANK PAIN/RETENTION-RESOLVED  Mrs. Annmarie HONEYCUTT is a pleasant 64-year-old female who presents to clinic today for RE-evaluation.  This is a 6 months follow-up with concerns of gross hematuria occurring recently in the last 3 weeks, right greater than left-sided flank pain, lower back pain, acute cystitis with hematuria, with PRIOR concerns of dysuria, requiring an indwelling Shaikh catheter-DISCONTINUED.      She was recently evaluated by PCP with KUB completed 9/13/2024 showing bilateral renal stones and a questionable left distal ureteral stone.  She is in no apparent discomfort today and reports improvement in her hematuria episodes.  She denies dysuria or any burning with urination however has had persistent right greater than left-sided flank pain.    Patient reports doing relatively well on tamsulosin 0.4 mg and denies any side effects so far.  Her urinalysis today is completely negative for leukocyte esterase, it is negative for nitrites, it is  negative for gross/but showed trace microscopic hematuria.  Significantly her postvoid residual is 0 mL.     Last year-12/2023, patient had developed urinary retention and a syncope episode requiring ER evaluation.  She denies any urinary symptoms prior to episode.  SHe denies having any groin trauma, nevertheless we discussed the different causes of painful urination such as infection, inflammation, dietary factors, or problems with her bladder, versus kidney stones.     AGAIN, We discussed inflammation that can be caused by irritation to the skin, or dysuria caused by and underlying bladder conditions.We also discussed urinary tract infections which can be easily treated with antibiotics.  However her urine dipstick is completely negative for any infection today. We discussed anatomic obstructions or malformations due to ureteral stricture, pain due to external lesions on the genitalia, with urine touching this lesions causing pain.  Discussed external irritation reaction from frequent douching, or application of irritating/allergenic products to the vaginal area.  Patient denies utilizing any of these products.    GROSS HEMATURIA/   BILATERAL Nephrolithiasis/ R>L FLANK PAIN : PATIENT  Presents to clinic with RIGHT>LEFT flank pain that has been ongoing for about 3 weeks, worsening the last 5 days, causing  significant nausea, stomach pain and discomfort.  We discussed the presence of the current tiny bilateral nonobstructing stones. We have discussed the various parameters regarding spontaneous passage including the notion that a tiny 1-4 mm stone has a 95% high likelihood of spontaneous passage versus a larger stone  being caught up in the upper areas of the urinary tract.     We also discussed the medical management of stone disease and the use of medical expulsive therapy in the form of Flomax. This is used in an off label setting. I also talked about nonoperative management including ambulation and increasing  fluids and hot tub as being an effective adjuncts in the treatment of a stone.We discussed the absolute relative indicators for intervention including the presence of sepsis, and pain we cannot control as the primary need for urgent intervention.  We discussed placement of a stent if indicated and the management of the stent as well.     GERD/IBS- Patient has significant irritable bowel syndrome, characterized by extreme amounts of constipation.  We spoke about the impact of this on bladder function.  We spoke about  its relationship to recurrent urinary tract infections. We discussed the need to increase p.o. fluid intake to at least 2 to 3 L of water daily, and discussed the physiology of colonic motility as well as use of MiraLAX as a bulk laxative versus the newer class of serotonin uptake blockers such as Linzess.  We stressed the need for a daily bowel movement and discussed the [place] stool scale at length. We also discussed a follow-up with her  GI nurse practitioner, she reports she had colonoscopy postponed.       PLAN  RESent her Urine for culture, call results if any positive bacterial growth     We discussed only treating infections with positive bacterial growth     We discussed  OTHER treatment options for her flank pain with patient encouraged to continue conservative therapy alternating NSAID/Tylenol as tolerated, Also including hot baths, showers, warm compresses to lower back as tolerated. Also encouraged walking, physical therapy, light exercises as tolerated    Rest is the most important treatment in the case of flank pain. Rest and physical therapy are enough to improve minor pain. Discussed to monitor her daily routine with prevention of flank pain by: At least Drinking 8 glasses of water per day, Limiting your alcohol consumption.  Having a healthy diet containing fruits, vegetables, and a lot of fluids, Practicing safe sex.  Also maintaining proper hygiene of your body as well as the  environment.    Discussed a kidney stone prevention diet to include increasing p.o. fluid intake, to at least 1 to 2 L of water daily.  She is to avoid caffeine products such as cola, coffee, and to avoid soft or soda drinks.  She is to decrease her sodium consumption as in  Fast foods, soler, salted nuts, canned foods, and smoked/cured foods.     She is also to decrease her oxalate consumption, as in spinach, [place] greens, and Rhubarb.  Also important is to decrease protein intake, as in red meats, peanut butter, and also avoid nuts.    Continue Flomax daily as prescribed, Continue all other medications    Strict bowel regiment due to her issues with diarrhea/severe constipation    Samples Linzess 145 mcg given for trial, patient will continue daily MiraLAX    We discussed upper tract investigation via CT scan renal stone protocol-will schedule    Follow-up in clinic as discussed, review CT scan/definitive plan of care    Encouraged to strain her urine for evaluation of distal ureteral stone    May return sooner if need be    Will go to ER if any worsening symptoms    Patient Agreeable plan of care       6Patient reports that she is not currently experiencing any symptoms of urinary incontinence.      BMI is within normal parameters. No other follow-up for BMI required.      RADIOLOGY (CT AND/OR KUB):    CT Abdomen and Pelvis: No results found for this or any previous visit.     CT Stone Protocol: No results found for this or any previous visit.     KUB: No results found for this or any previous visit.       LABS (3 MONTHS):    Office Visit on 10/01/2024   Component Date Value Ref Range Status    Color 10/01/2024 Yellow  Yellow, Straw, Dark Yellow, Haley Final    Clarity, UA 10/01/2024 Clear  Clear Final    Specific Gravity  10/01/2024 1.010  1.005 - 1.030 Final    pH, Urine 10/01/2024 6.0  5.0 - 8.0 Final    Leukocytes 10/01/2024 Negative  Negative Final    Nitrite, UA 10/01/2024 Negative  Negative Final     Protein, POC 10/01/2024 Negative  Negative mg/dL Final    Glucose, UA 10/01/2024 Negative  Negative mg/dL Final    Ketones, UA 10/01/2024 Negative  Negative Final    Urobilinogen, UA 10/01/2024 Normal  Normal, 0.2 E.U./dL Final    Bilirubin 10/01/2024 Negative  Negative Final    Blood, UA 10/01/2024 Trace (A)  Negative Final    Lot Number 10/01/2024 [id number]   Final    Expiration Date 10/01/2024 10/25/24   Final       Smoking Cessation Counseling:  Never a smoker.  Patient does not currently use any tobacco products.       Follow Up   Return in about 5 weeks (around 11/5/2024) for Next scheduled follow up, GROSS/MICRO HEMATURIA, RECURRENT UTI/DYSURIA/DETRUSSOR INS, Review CT Scan.    Patient was given instructions and counseling regarding her condition or for health maintenance advice. Please see specific information pulled into the AVS if appropriate.          This document has been electronically signed by Griselda Cheng-Akwa, APRN   October 2, 2024 11:34 EDT      Dictated Utilizing Dragon Dictation: Part of this note may be an electronic transcription/translation of spoken language to printed text using the Dragon Dictation System.     Transcribed from ambient dictation for Griselda Cheng-Akwa, APRN by Leslie Coreas.  03/04/24   11:53 EST    Patient or patient representative verbalized consent to the visit recording.  I have personally performed the services described in this document as transcribed by the above individual, and it is both accurate and complete.

## 2024-10-02 PROBLEM — N20.0 BILATERAL NEPHROLITHIASIS: Status: ACTIVE | Noted: 2024-10-02

## 2024-10-02 PROBLEM — R31.0 GROSS HEMATURIA: Status: ACTIVE | Noted: 2024-10-02

## 2024-10-02 PROBLEM — N30.01 ACUTE CYSTITIS WITH HEMATURIA: Status: ACTIVE | Noted: 2024-10-02

## 2024-10-02 PROBLEM — R35.0 FREQUENCY OF MICTURITION: Status: ACTIVE | Noted: 2024-10-02

## 2024-10-02 PROBLEM — R10.9 BILATERAL FLANK PAIN: Status: ACTIVE | Noted: 2024-10-02

## 2024-10-02 LAB — BACTERIA SPEC AEROBE CULT: NO GROWTH

## 2024-10-04 ENCOUNTER — TELEPHONE (OUTPATIENT)
Dept: UROLOGY | Facility: CLINIC | Age: 64
End: 2024-10-04
Payer: COMMERCIAL

## 2024-10-04 NOTE — TELEPHONE ENCOUNTER
I called pt negative urine culture pt verbalized understanding.    ----- Message from Griselda Cheng-Akwa sent at 10/3/2024  4:59 PM EDT -----  Please let patient know her urine culture results are negative for any bacterial infection at this time.  Urine Culture -No growth    However she may drop off another urine dip if she feels symptomatic for UTI.    THANK YOU

## 2024-10-09 ENCOUNTER — TELEPHONE (OUTPATIENT)
Dept: UROLOGY | Facility: CLINIC | Age: 64
End: 2024-10-09
Payer: COMMERCIAL

## 2024-10-09 ENCOUNTER — HOSPITAL ENCOUNTER (OUTPATIENT)
Dept: CT IMAGING | Facility: HOSPITAL | Age: 64
Discharge: HOME | End: 2024-10-09
Admitting: NURSE PRACTITIONER
Payer: COMMERCIAL

## 2024-10-09 PROCEDURE — 74176 CT ABD & PELVIS W/O CONTRAST: CPT

## 2024-10-09 NOTE — TELEPHONE ENCOUNTER
I called the pt with her results     ----- Message from Griselda Cheng-Akwa sent at 10/9/2024  2:35 PM EDT -----  Please let patient know CT scan results unremarkable at this time.  Does not show any obstructing ureteral stones.  She has nonobstructing stones in the kidney and nothing to worry about right now.      Nevertheless she has moderate to large volume stool for which I recommend stool softeners daily.    Follow-up in clinic as discussed.    IMPRESSION:   1. Nonobstructing stones in both kidneys   2. Large stool burden   3. Other findings as above

## 2024-11-12 ENCOUNTER — PRIOR AUTHORIZATION (OUTPATIENT)
Dept: CARDIOLOGY | Facility: CLINIC | Age: 64
End: 2024-11-12
Payer: COMMERCIAL

## 2024-11-13 ENCOUNTER — TELEPHONE (OUTPATIENT)
Dept: CARDIOLOGY | Facility: CLINIC | Age: 64
End: 2024-11-13
Payer: COMMERCIAL

## 2024-11-13 NOTE — TELEPHONE ENCOUNTER
Hub to relay ;    Called patient to let her know she needs to have Carotid Bilateral u/s before appointment on 11/20 if this isn't scheduled she needs to call hospital have it scheduled then follow up after

## 2024-11-14 ENCOUNTER — OFFICE VISIT (OUTPATIENT)
Dept: UROLOGY | Facility: CLINIC | Age: 64
End: 2024-11-14
Payer: COMMERCIAL

## 2024-11-14 VITALS
SYSTOLIC BLOOD PRESSURE: 121 MMHG | BODY MASS INDEX: 23.49 KG/M2 | WEIGHT: 137.6 LBS | HEIGHT: 64 IN | DIASTOLIC BLOOD PRESSURE: 77 MMHG | HEART RATE: 75 BPM

## 2024-11-14 DIAGNOSIS — N30.01 ACUTE CYSTITIS WITH HEMATURIA: ICD-10-CM

## 2024-11-14 DIAGNOSIS — R10.9 BILATERAL FLANK PAIN: ICD-10-CM

## 2024-11-14 DIAGNOSIS — R35.0 FREQUENCY OF MICTURITION: ICD-10-CM

## 2024-11-14 DIAGNOSIS — N20.0 BILATERAL NEPHROLITHIASIS: Primary | ICD-10-CM

## 2024-11-14 DIAGNOSIS — N34.3 DYSURIA-FREQUENCY SYNDROME: ICD-10-CM

## 2024-11-14 LAB
BILIRUB BLD-MCNC: NEGATIVE MG/DL
CLARITY, POC: CLEAR
COLOR UR: YELLOW
EXPIRATION DATE: ABNORMAL
GLUCOSE UR STRIP-MCNC: NEGATIVE MG/DL
KETONES UR QL: NEGATIVE
LEUKOCYTE EST, POC: ABNORMAL
Lab: ABNORMAL
NITRITE UR-MCNC: NEGATIVE MG/ML
PH UR: 6.5 [PH] (ref 5–8)
PROT UR STRIP-MCNC: ABNORMAL MG/DL
RBC # UR STRIP: NEGATIVE /UL
SP GR UR: 1.01 (ref 1–1.03)
UROBILINOGEN UR QL: NORMAL

## 2024-11-14 PROCEDURE — 99214 OFFICE O/P EST MOD 30 MIN: CPT | Performed by: NURSE PRACTITIONER

## 2024-11-14 PROCEDURE — 87086 URINE CULTURE/COLONY COUNT: CPT | Performed by: NURSE PRACTITIONER

## 2024-11-14 PROCEDURE — 81003 URINALYSIS AUTO W/O SCOPE: CPT | Performed by: NURSE PRACTITIONER

## 2024-11-14 NOTE — PROGRESS NOTES
Chief Complaint  Frequency of micturition (ONE MONTH FOLLOW UP ACUTE CYSTITIS /URINE FREQUENCY/KIDNEY STONES/FLANK/BACK PAIN )    Subjective          Annmarie Hopkins presents to [facility] for DYSURIA-IMPROVED/FLANK/BACK PAIN/KIDNEY STONES/IBS-CV  History of Present Illness    The patient is a pleasant 64-year-old female who returns to clinic today for evaluation. This is a ONE-month follow-up for kidney stones, bilateral flank pain, acute cystitis with urinary symptoms of frequency urgency and dysuria.  She is in no apparent discomfort today and reports doing relatively well besides flank pain.  She denies any burning with urination, denies any urinary incontinent episodes.    Last month patient had presented with concerns of gross hematuria, bilateral flank pain, and kidney stones.  She did have 1 episode of hematuria lasting 3 days for which she was evaluated by her PCP with concerns of urinary tract infection.  Nevertheless her urine culture was negative for any bacterial growth.   Urine Culture  No growth         On clinic evaluation today 11/14/2020 her urinalysis showed 1+ leukocyte esterase, it is negative for nitrite, it is negative for gross and microscopic hematuria.  Her PVR is 0 mL.  Patient reports persistent flank pain right side greater than left side without any CVA tenderness.    We did proceed with CT abdomen and pelvis 10//2024 which was remarkable for bilateral nonobstructing stones, moderate to large volume stool consistent with chronic idiopathic constipation.  She also had a prior KUB showing bilateral nephrolithiasis, with questionable left distal ureteral stone.  Patient also did have large volume stool consistent with chronic idiopathic constipation. TODAY,  We spoke about the impact of this on bladder function.  We spoke about  its relationship to recurrent urinary tract infections, vaginal pain, pelvic/bladder pressure, back pain, flank pain  and abdominal pain she was experiencing.    Patient was last evaluated in clinic  6 months ago with prior concerns of urinary retention and dysuria.  She was post hospitalization initially requiring Shaikh catheter-RESOLVED. When last evaluated in clinic on 2023, patient was 3 months post ER follow-up for urinary retention, during which she did have an indwelling Shaikh catheter in place, which were eventually discontinued after starting her on tamsulosin. Her postvoid residuals have been greater than 800 cc.     Patient also had a CT abdomen and pelvis completed, which was unremarkable with the cause of her urinary retention, not noted on CT. For her plan of care, we had discussed continuing tamsulosin 0.4 mg daily. She has tolerated medication well and returns to clinic today for recheck.  Today she reports taking tamsulosin intermittently. She has had constipation issues for which we strongly recommend daily MiraLAX due to the correlation between severe constipation and urinary retention. Overall, she is doing remarkably better.    OVERALL, She reports significant weight loss since 2023 by avoiding fried foods, eating chicken and vegetables. The Shaikh catheter was painful, SHE desires to never have that replaced.. She has not had to self-catheterize. She denies any burning, frequency, or urgency. She inquires if she should continue taking the water pill. She takes it in the morning and sometimes before noon. She denies any swelling in her ankles. She denies any straining when she urinates. She was dehydrated, her potassium was low, and had bowel issues. She is not on blood pressure medication. She wears her night pad when she is out. She drinks decaf hot tea.    Her father had a double bypass in . Her mother passed away from a massive stroke. Her paternal grandfather  of a stroke at 62-year-old. Her older sister had a heart attack and a clotted artery.    She had an echocardiogram and stress test  "done, which she passed. She had a CT cardio calcium scan. Her total calcium score was 338. She has an appointment with her pulmonologist tomorrow.    IMPRESSION:CT Abdomen Pelvis Stone Protocol 10/09/24   1. Nonobstructing stones in both kidneys   2. Large stool burden   3. Other findings as abov    FINDINGS:XR Abdomen KUB 09/13/24  2 views the abdomen Large stool burden No evidence of bowel obstruction. Tiny calcifications over both kidneys.  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    Bladder spasms 11/20/2023    Retention of urine 11/20/2023    Paroxysmal atrial fibrillation 11/27/2023    Atherosclerosis of abdominal aorta 11/27/2023    Dyslipidemia 11/27/2023    THUY (obstructive sleep apnea) 11/27/2023    Syncope and collapse 11/27/2023    Shortness of breath 11/27/2023    Chronic bladder pain 12/04/2023    ASCVD (arteriosclerotic cardiovascular disease) 03/18/2024    Gross hematuria 10/02/2024    Bilateral nephrolithiasis 10/02/2024    Frequency of micturition 10/02/2024    Bilateral flank pain 10/02/2024    Acute cystitis with hematuria 10/02/2024     Resolved Ambulatory Problems     Diagnosis Date Noted    No Resolved Ambulatory Problems     Past Medical History:   Diagnosis Date    Abdominal fibromatosis     Arthritis     Hypothyroid     Sleep apnea       Objective   Vital Signs:   /77   Pulse 75   Ht 162.6 cm (64.02\")   Wt 62.4 kg (137 lb 9.6 oz)   BMI 23.61 kg/m²       ROS:   Review of Systems   Constitutional:  Positive for activity change, appetite change and unexpected weight loss. Negative for diaphoresis, fatigue, fever and unexpected weight gain.   HENT:  Negative for congestion, ear discharge, ear pain, nosebleeds, rhinorrhea, sinus pressure and sore throat.    Eyes:  Negative for blurred vision, double vision, photophobia, pain, redness and visual disturbance.   Respiratory:  Negative for apnea, cough, chest tightness, shortness of " breath, wheezing and stridor.    Cardiovascular:  Negative for chest pain and palpitations.   Gastrointestinal:  Positive for abdominal distention, abdominal pain and constipation. Negative for diarrhea, nausea and vomiting.   Endocrine: Negative for polydipsia, polyphagia and polyuria.   Genitourinary:  Positive for difficulty urinating, dysuria, flank pain, frequency, hematuria, pelvic pain, pelvic pressure and urgency. Negative for decreased urine volume and urinary incontinence.   Musculoskeletal:  Positive for back pain. Negative for arthralgias and joint swelling.   Skin:  Positive for dry skin and pallor. Negative for rash and wound.   Allergic/Immunologic: Negative for food allergies.   Neurological:  Negative for dizziness, tremors, syncope, weakness, light-headedness, headache, memory problem and confusion.   Hematological:  Bruises/bleeds easily.   Psychiatric/Behavioral:  Positive for sleep disturbance and stress. Negative for behavioral problems. The patient is not nervous/anxious.         Physical Exam  Constitutional:       General: She is in acute distress.      Appearance: She is well-developed. She is ill-appearing.   HENT:      Head: Normocephalic and atraumatic.   Eyes:      Pupils: Pupils are equal, round, and reactive to light.   Neck:      Thyroid: No thyromegaly.      Trachea: No tracheal deviation.   Cardiovascular:      Rate and Rhythm: Normal rate and regular rhythm.      Heart sounds: No murmur heard.  Pulmonary:      Effort: Pulmonary effort is normal. No respiratory distress.      Breath sounds: Normal breath sounds. No stridor. No wheezing.   Abdominal:      General: Bowel sounds are normal. There is distension.      Palpations: Abdomen is soft.      Tenderness: There is abdominal tenderness.   Genitourinary:     Labia:         Right: No tenderness.         Left: No tenderness.       Vagina: Normal. No vaginal discharge.      Comments: Slow urinary stream, dysuria, urinary  retention-improved  Musculoskeletal:         General: Tenderness present. No deformity. Normal range of motion.      Cervical back: Normal range of motion.   Skin:     General: Skin is warm and dry.      Capillary Refill: Capillary refill takes less than 2 seconds.      Coloration: Skin is not pale.      Findings: No erythema or rash.   Neurological:      Mental Status: She is alert and oriented to person, place, and time.      Cranial Nerves: No cranial nerve deficit.      Sensory: No sensory deficit.      Coordination: Coordination normal.   Psychiatric:         Behavior: Behavior normal.         Thought Content: Thought content normal.         Judgment: Judgment normal.        Result Review :     UA          3/4/2024    10:25 10/1/2024    09:56 11/14/2024    11:33   Urinalysis   Ketones, UA Negative  Negative  Negative    Leukocytes, UA Negative  Negative  Small (1+)      Urine Culture          12/4/2023    14:08 3/4/2024    10:20 10/1/2024    09:56   Urine Culture   Urine Culture No growth  No growth  No growth        Assessment and Plan    Problem List Items Addressed This Visit          Gastrointestinal Abdominal     Bilateral flank pain       Genitourinary and Reproductive     Dysuria-frequency syndrome    Bilateral nephrolithiasis - Primary    Frequency of micturition    Relevant Orders    POC Urinalysis Dipstick, Automated (Completed)    Urine Culture - Urine, Urine, Random Void    Acute cystitis with hematuria                                             ASSESSMENT  GROSS HEMATURIA/KIDNEY STONES /FLANK /ABDOMINAL AND BACK PAIN/RETENTION-RESOLVED  Mrs. Annmarie HONEYCUTT is a pleasant 64-year-old female who presents to clinic today for RE-evaluation.  This is a 1 months follow-up with concerns of gross hematuria-resolved, acute cystitis with hematuria, occurring recently in the last 4 weeks, right greater than left-sided flank pain, lower back pain, acute cystitis with hematuria.  Patient also had prior concerns  of dysuria, requiring an indwelling Shaikh catheter-DISCONTINUED.  When last evaluated in clinic she was in apparent discomfort nevertheless her urine culture was completely negative for bacterial growth.  In clinic today her urinalysis still show 1+ leukocyte esterase.  Nevertheless it is again negative for nitrites, it is negative for gross/microscopic hematuria.  Her PVR is 0 mL.     Last month, we did proceed with CT abdomen and pelvis which was unremarkable besides bilateral nonobstructing stones, moderate to large volume stool consistent with chronic idiopathic constipation.    THE Patient had also been recently evaluated by PCP with KUB completed 9/13/2024 showing bilateral renal stones and a questionable left distal ureteral stone.  She is in no apparent discomfort today and reports improvement in her hematuria episodes.  She denies dysuria or any burning with urination however has had persistent right greater than left-sided flank pain.    Patient reports doing relatively well on tamsulosin 0.4 mg and denies any side effects so far.  Her urinalysis today is completely negative for leukocyte esterase, it is negative for nitrites, it is negative for gross/but showed trace microscopic hematuria.  Significantly her postvoid residual is 0 mL.     Last year-12/2023, patient had developed urinary retention and a syncope episode requiring ER evaluation.  She denies any urinary symptoms prior to episode.  SHe denies having any groin trauma, nevertheless we discussed the different causes of painful urination such as infection, inflammation, dietary factors, or problems with her bladder, versus kidney stones.     AGAIN, We discussed inflammation that can be caused by irritation to the skin, or dysuria caused by and underlying bladder conditions.We also discussed urinary tract infections which can be easily treated with antibiotics.  However her urine dipstick is completely negative for any infection today. We discussed  anatomic obstructions or malformations due to ureteral stricture, pain due to external lesions on the genitalia, with urine touching this lesions causing pain.  Discussed external irritation reaction from frequent douching, or application of irritating/allergenic products to the vaginal area.  Patient denies utilizing any of these products.    GROSS HEMATURIA/   BILATERAL Nephrolithiasis/ R>L FLANK PAIN : PATIENT  Presents to clinic with RIGHT>LEFT flank pain that has been ongoing for about 3 weeks, worsening the last 5 days, causing  significant nausea, stomach pain and discomfort.  We discussed the presence of the current tiny bilateral nonobstructing stones. We have discussed the various parameters regarding spontaneous passage including the notion that a tiny 1-4 mm stone has a 95% high likelihood of spontaneous passage versus a larger stone  being caught up in the upper areas of the urinary tract.     We also discussed the medical management of stone disease and the use of medical expulsive therapy in the form of Flomax. This is used in an off label setting. I also talked about nonoperative management including ambulation and increasing fluids and hot tub as being an effective adjuncts in the treatment of a stone.We discussed the absolute relative indicators for intervention including the presence of sepsis, and pain we cannot control as the primary need for urgent intervention.  We discussed placement of a stent if indicated and the management of the stent as well.     GERD/IBS- Patient has significant irritable bowel syndrome, characterized by extreme amounts of constipation.  We spoke about the impact of this on bladder function.  We spoke about  its relationship to recurrent urinary tract infections. We discussed the need to increase p.o. fluid intake to at least 2 to 3 L of water daily, and discussed the physiology of colonic motility as well as use of MiraLAX as a bulk laxative versus the newer class of  serotonin uptake blockers such as Linzess.  We stressed the need for a daily bowel movement and discussed the [place] stool scale at length. We also discussed a follow-up with her  GI nurse practitioner, she reports she had colonoscopy postponed.       PLAN  RESent her Urine for culture, call results if any positive bacterial growth     We discussed only treating infections with positive bacterial growth     We discussed  OTHER treatment options for her flank pain with patient encouraged to continue conservative therapy alternating NSAID/Tylenol as tolerated, Also including hot baths, showers, warm compresses to lower back as tolerated. Also encouraged walking, physical therapy, light exercises as tolerated    Rest is the most important treatment in the case of flank pain. Rest and physical therapy are enough to improve minor pain. Discussed to monitor her daily routine with prevention of flank pain by: At least Drinking 8 glasses of water per day, Limiting your alcohol consumption.  Having a healthy diet containing fruits, vegetables, and a lot of fluids, Practicing safe sex.  Also maintaining proper hygiene of your body as well as the environment.    Discussed a kidney stone prevention diet to include increasing p.o. fluid intake, to at least 1 to 2 L of water daily.  She is to avoid caffeine products such as cola, coffee, and to avoid soft or soda drinks.  She is to decrease her sodium consumption as in  Fast foods, soler, salted nuts, canned foods, and smoked/cured foods.     She is also to decrease her oxalate consumption, as in spinach, [place] greens, and Rhubarb.  Also important is to decrease protein intake, as in red meats, peanut butter, and also avoid nuts.    Continue Flomax daily as prescribed, Continue all other medications    Strict bowel regiment due to her issues with diarrhea/severe constipation    Samples Linzess 145 mcg given for trial, patient will continue daily MiraLAX    We discussed upper  tract investigation via CT scan renal stone protocol-will schedule    Follow-up in clinic as discussed, review CT scan/definitive plan of care    Encouraged to strain her urine for evaluation of distal ureteral stone    May return sooner if need be    Will go to ER if any worsening symptoms    Patient Agreeable plan of care       6Patient reports that she is not currently experiencing any symptoms of urinary incontinence.      BMI is within normal parameters. No other follow-up for BMI required.      RADIOLOGY (CT AND/OR KUB):    CT Abdomen and Pelvis: No results found for this or any previous visit.     CT Stone Protocol: No results found for this or any previous visit.     KUB: No results found for this or any previous visit.       LABS (3 MONTHS):    Office Visit on 11/14/2024   Component Date Value Ref Range Status    Color 11/14/2024 Yellow  Yellow, Straw, Dark Yellow, Haley Final    Clarity, UA 11/14/2024 Clear  Clear Final    Specific Gravity  11/14/2024 1.010  1.005 - 1.030 Final    pH, Urine 11/14/2024 6.5  5.0 - 8.0 Final    Leukocytes 11/14/2024 Small (1+) (A)  Negative Final    Nitrite, UA 11/14/2024 Negative  Negative Final    Protein, POC 11/14/2024 Trace (A)  Negative mg/dL Final    Glucose, UA 11/14/2024 Negative  Negative mg/dL Final    Ketones, UA 11/14/2024 Negative  Negative Final    Urobilinogen, UA 11/14/2024 Normal  Normal, 0.2 E.U./dL Final    Bilirubin 11/14/2024 Negative  Negative Final    Blood, UA 11/14/2024 Negative  Negative Final    Lot Number 11/14/2024 [id number]   Final    Expiration Date 11/14/2024 5/1/2026   Final   Office Visit on 10/01/2024   Component Date Value Ref Range Status    Color 10/01/2024 Yellow  Yellow, Straw, Dark Yellow, Haley Final    Clarity, UA 10/01/2024 Clear  Clear Final    Specific Gravity  10/01/2024 1.010  1.005 - 1.030 Final    pH, Urine 10/01/2024 6.0  5.0 - 8.0 Final    Leukocytes 10/01/2024 Negative  Negative Final    Nitrite, UA 10/01/2024  Negative  Negative Final    Protein, POC 10/01/2024 Negative  Negative mg/dL Final    Glucose, UA 10/01/2024 Negative  Negative mg/dL Final    Ketones, UA 10/01/2024 Negative  Negative Final    Urobilinogen, UA 10/01/2024 Normal  Normal, 0.2 E.U./dL Final    Bilirubin 10/01/2024 Negative  Negative Final    Blood, UA 10/01/2024 Trace (A)  Negative Final    Lot Number 10/01/2024 [id number]   Final    Expiration Date 10/01/2024 10/25/24   Final    Urine Culture 10/01/2024 No growth   Final       Smoking Cessation Counseling:  Never a smoker.  Patient does not currently use any tobacco products.       Follow Up   Return in about 6 months (around 5/14/2025) for Next scheduled follow up, RECURRENT UTI/DYSURIA/DETRUSSOR INSTABILITY/KIDNEY STONES EVAL.    Patient was given instructions and counseling regarding her condition or for health maintenance advice. Please see specific information pulled into the AVS if appropriate.          This document has been electronically signed by Griselda Cheng-Akwa, APRN   November 14, 2024 12:41 EST      Dictated Utilizing Dragon Dictation: Part of this note may be an electronic transcription/translation of spoken language to printed text using the Dragon Dictation System.     Transcribed from ambient dictation for Griselda Cheng-Akwa, APRN by Lelsie Coreas.  03/04/24   11:53 EST    Patient or patient representative verbalized consent to the visit recording.  I have personally performed the services described in this document as transcribed by the above individual, and it is both accurate and complete.

## 2024-11-16 LAB — BACTERIA SPEC AEROBE CULT: NO GROWTH

## 2024-11-18 ENCOUNTER — TELEPHONE (OUTPATIENT)
Dept: UROLOGY | Facility: CLINIC | Age: 64
End: 2024-11-18
Payer: COMMERCIAL

## 2024-11-18 NOTE — TELEPHONE ENCOUNTER
I tried to call pt with negative urine culture results left voicemail.      ----- Message from Griselda Cheng-Akwa sent at 11/18/2024  2:33 PM EST -----  Please kindly let patient know her urine culture results are negative for bacterial infection at this time.    Urine Culture -No growth    However she may drop off another urine dip if she feels symptomatic.    If not follow-up in clinic as discussed.    THANK YOU

## 2024-11-20 ENCOUNTER — TELEPHONE (OUTPATIENT)
Dept: CARDIOLOGY | Facility: CLINIC | Age: 64
End: 2024-11-20

## 2024-11-20 ENCOUNTER — OFFICE VISIT (OUTPATIENT)
Dept: CARDIOLOGY | Facility: CLINIC | Age: 64
End: 2024-11-20
Payer: COMMERCIAL

## 2024-11-20 VITALS
OXYGEN SATURATION: 96 % | HEART RATE: 76 BPM | BODY MASS INDEX: 23.62 KG/M2 | SYSTOLIC BLOOD PRESSURE: 116 MMHG | DIASTOLIC BLOOD PRESSURE: 68 MMHG | HEIGHT: 64 IN

## 2024-11-20 DIAGNOSIS — I48.0 PAROXYSMAL ATRIAL FIBRILLATION: ICD-10-CM

## 2024-11-20 DIAGNOSIS — I65.23 BILATERAL CAROTID ARTERY STENOSIS: Primary | ICD-10-CM

## 2024-11-20 DIAGNOSIS — G47.33 OSA (OBSTRUCTIVE SLEEP APNEA): ICD-10-CM

## 2024-11-20 DIAGNOSIS — E78.5 DYSLIPIDEMIA: ICD-10-CM

## 2024-11-20 DIAGNOSIS — I25.10 ASCVD (ARTERIOSCLEROTIC CARDIOVASCULAR DISEASE): ICD-10-CM

## 2024-11-20 PROCEDURE — 99214 OFFICE O/P EST MOD 30 MIN: CPT | Performed by: NURSE PRACTITIONER

## 2024-11-20 NOTE — TELEPHONE ENCOUNTER
Caller: JANIA    Relationship: Other    Best call back number: [phone]     What is the best time to reach you: ANY BEFORE 5     What was the call regarding: STATES THEY HAVE NOT RECEIVED THIS YET, BEST FAX NUMBER IS LISTED BELOW     FAX: [phone]

## 2024-11-20 NOTE — TELEPHONE ENCOUNTER
Caller: JANIA    Relationship: Other    Best call back number: [phone]    What form or medical record are you requesting: [employer]    Who is requesting this form or medical record from you: [employer]    How would you like to receive the form or medical records (pick-up, mail, fax): FAX  If fax, what is the fax number: [phone]      Timeframe paperwork needed: ASAP    Additional notes: LOOKS LIKE REBECCA VAZQUEZ SIGNED THIS ON 11.12.24. FACILITY DID NOT RECEIVE, PLEASE RE-FAX.

## 2024-11-20 NOTE — PROGRESS NOTES
[facility]             [place]BECCA [place], [place]  Annmarie Hopkins  1960  11/20/2024    Patient Active Problem List   Diagnosis    Dysuria-frequency syndrome    Bladder spasms    Retention of urine    Paroxysmal atrial fibrillation    Atherosclerosis of abdominal aorta    Dyslipidemia    THUY (obstructive sleep apnea)    Syncope and collapse    Shortness of breath    Chronic bladder pain    ASCVD (arteriosclerotic cardiovascular disease)    Gross hematuria    Bilateral nephrolithiasis    Frequency of micturition    Bilateral flank pain    Acute cystitis with hematuria       Dear Hakan Jordan PA:    Subjective     Chief Complaint   Patient presents with    Follow-up     ROUTINE-WANTS TO DISCUSS NEED FOR US       HPI:     This is a 64 y.o. female with known past medical history of paroxysmal atrial fibrillation, dyslipidemia, obstructive sleep apnea.      Annmarie Hopkins presents today for routine cardiology follow up.  Patient states has been doing overall well since her last visit.  Denies any chest pain or shortness of breath.  Brings in recent lab work from September 2024 which is overall stable with controlled LDL.  Blood pressure controlled.  Did not have her carotid ultrasound that was ordered at her last visit due to miscommunication between her and scheduling.  Was unable to tolerate CPAP    Diagnostic Testing  Cardiac event monitor 12/2023: Predominant normal sinus rhythm with episodes of sinus bradycardia and rare PVCs  Echocardiogram 1/2023: EF 61 to 65%  Nuclear stress test 1/2023: No evidence of ischemia  CT calcium score 2/2024: 338  CT coronary angiogram 5/2024: Total calcium score of 359 with minimal to moderate stenosis noted in left main, LAD, left circumflex and right coronary artery     All other systems were reviewed and were negative.    Patient Active Problem List   Diagnosis    Dysuria-frequency syndrome    Bladder  spasms    Retention of urine    Paroxysmal atrial fibrillation    Atherosclerosis of abdominal aorta    Dyslipidemia    THUY (obstructive sleep apnea)    Syncope and collapse    Shortness of breath    Chronic bladder pain    ASCVD (arteriosclerotic cardiovascular disease)    Gross hematuria    Bilateral nephrolithiasis    Frequency of micturition    Bilateral flank pain    Acute cystitis with hematuria       family history includes Breast cancer in her paternal cousin; Cancer in her mother; Diabetes in her father; Heart disease in her father and paternal grandfather; Hypertension in her father.     reports that she has never smoked. She has never been exposed to tobacco smoke. She has never used smokeless tobacco. She reports that she does not drink alcohol and does not use drugs.    Allergies   Allergen Reactions    Codeine Unknown - Low Severity and Shortness Of Breath         Current Outpatient Medications:     alendronate (FOSAMAX) 70 MG tablet, Take 1 tablet by mouth Every 7 (Seven) Days., Disp: , Rfl:     ascorbic acid (VITAMIN C) 500 MG tablet, Take 1 tablet by mouth Daily., Disp: , Rfl:     aspirin 81 MG EC tablet, Take 1 tablet by mouth Daily., Disp: , Rfl:     Calcium Carb-Cholecalciferol (CALCIUM 600+D3) 600-200 MG-UNIT tablet, Take  by mouth., Disp: , Rfl:     clobetasol (TEMOVATE) 0.05 % ointment, , Disp: , Rfl:     Diclofenac Sodium (VOLTAREN) 1 % gel gel, , Disp: , Rfl:     levocetirizine (XYZAL) 5 MG tablet, TAKE 1 2 TO 1 TABLET BY MOUTH EVERY EVENING, Disp: , Rfl: 5    levothyroxine (SYNTHROID, LEVOTHROID) 88 MCG tablet, , Disp: , Rfl:     metoprolol tartrate (LOPRESSOR) 50 MG tablet, Take 1 tablet by mouth As Needed (Heart rate). Take metoprolol the night before and 1 hour prior to CT scan.  Take 1 tablet if heart rate <70 bpm.  Take 2 tablets if heart rate > 70 bpm, Disp: 3 tablet, Rfl: 1    montelukast (SINGULAIR) 10 MG tablet, Take 1 tablet by mouth Daily., Disp: , Rfl:     multivitamin  (MULTI-VITAMIN DAILY PO), Take  by mouth Daily., Disp: , Rfl:     rivaroxaban (XARELTO) 20 MG tablet, Take 1 tablet by mouth Daily. LOT [id number] Expt 10/2025, Disp: 14 tablet, Rfl: 0    rosuvastatin (CRESTOR) 40 MG tablet, Take 1 tablet by mouth Daily., Disp: 90 tablet, Rfl: 3    tamsulosin (FLOMAX) 0.4 MG capsule 24 hr capsule, Take 1 capsule by mouth Daily., Disp: , Rfl:     triamcinolone (KENALOG) 0.5 % cream, , Disp: , Rfl:     Turmeric 500 MG capsule, Take  by mouth., Disp: , Rfl:       Physical Exam:  I have reviewed the patient's current vital signs as documented in the patient's EMR.   Vitals:    11/20/24 0943   BP: 116/68   Pulse: 76   SpO2: 96%     Body mass index is 23.62 kg/m².   There were no vitals filed for this visit.   Physical Exam  Constitutional:       General: She is not in acute distress.     Appearance: Normal appearance. She is well-developed and normal weight.   HENT:      Head: Normocephalic and atraumatic.   Eyes:      General: Lids are normal.      Conjunctiva/sclera: Conjunctivae normal.      Pupils: Pupils are equal, round, and reactive to light.   Neck:      Vascular: No carotid bruit or JVD.   Cardiovascular:      Rate and Rhythm: Normal rate and regular rhythm.      Pulses: Normal pulses.      Heart sounds: Normal heart sounds, S1 normal and S2 normal. No murmur heard.  Pulmonary:      Effort: Pulmonary effort is normal. No respiratory distress.      Breath sounds: Normal breath sounds. No wheezing.   Abdominal:      General: Bowel sounds are normal. There is no distension.      Palpations: Abdomen is soft. There is no hepatomegaly or splenomegaly.      Tenderness: There is no abdominal tenderness.   Musculoskeletal:         General: No swelling. Normal range of motion.      Cervical back: Normal range of motion and neck supple.      Right lower leg: No edema.      Left lower leg: No edema.   Skin:     General: Skin is warm and dry.      Coloration: Skin is not jaundiced.       "Findings: No rash.   Neurological:      General: No focal deficit present.      Mental Status: She is alert and oriented to person, place, and time. Mental status is at baseline.   Psychiatric:         Mood and Affect: Mood normal.         Speech: Speech normal.         Behavior: Behavior normal.         Thought Content: Thought content normal.         Judgment: Judgment normal.            DATA REVIEWED:     TTE/HUBERT:  Results for orders placed during the hospital encounter of 01/26/24    Adult Transthoracic Echo Complete w/ Color, Spectral and Contrast if necessary per protocol    Interpretation Summary    Left ventricular systolic function is normal. Left ventricular ejection fraction appears to be 61 - 65%.    Left ventricular diastolic function is consistent with (grade I) impaired relaxation.    Estimated right ventricular systolic pressure from tricuspid regurgitation is normal (<35 mmHg).      Laboratory evaluations:    Lab Results   Component Value Date    GLUCOSE 134 (H) 06/01/2024    BUN 5 (L) 06/01/2024    CREATININE 0.81 06/01/2024    EGFRIFAFRI 101 05/07/2024    BCR 6.2 (L) 06/01/2024    K 3.2 (L) 06/01/2024    CO2 24.9 06/01/2024    CALCIUM 9.1 06/01/2024    ALBUMIN 4.3 06/01/2024    LABIL2 1.7 07/31/2014    AST 21 06/01/2024    ALT 24 06/01/2024     Lab Results   Component Value Date    WBC 8.55 06/01/2024    HGB 13.7 06/01/2024    HCT 40.5 06/01/2024    MCV 90.4 06/01/2024     06/01/2024     Lab Results   Component Value Date    CHOL 117 04/12/2024    TRIG 82 04/12/2024    HDL 42 04/12/2024    LDL 59 04/12/2024     No results found for: \"TSH\", \"I5JJFQR\", \"S5LVFFL\", \"THYROIDAB\"  No results found for: \"HGBA1C\"  Lab Results   Component Value Date    ALT 24 06/01/2024     No results found for: \"HGBA1C\"  Lab Results   Component Value Date    CREATININE 0.81 06/01/2024     No results found for: \"IRON\", \"TIBC\", \"FERRITIN\"  Lab Results   Component Value Date    INR <0.90 04/03/2014    PROTIME 9.9 " "04/03/2014      No components found for: \"ABSOLUTELUNG\"    --------------------------------------------------------------------------------------------------------------------------    ASSESSMENT/PLAN:      Diagnosis Plan   1. Bilateral carotid artery stenosis  US Carotid Bilateral      2. Paroxysmal atrial fibrillation        3. ASCVD (arteriosclerotic cardiovascular disease)        4. Dyslipidemia        5. THUY (obstructive sleep apnea)            ASCVD  Carotid artery disease  CT coronary angiogram showed total calcium score of 359.  Noted multivessel atherosclerotic coronary artery disease with areas of mild to moderate stenosis (30-50%) due to mixed calcified and non-calcified plaque in the proximal and mid LAD that have a low likelihood of flow-limitation with an FFRct value of 0.88. Additionally, there is probably moderate (50-70%) stenosis in the ostium of the first diagonal branch, but the vessel is not large enough for FFRct evaluation. Patient is currently asymptomatic.  We discussed continue with medical management for now and if she develops symptoms we will proceed with left heart catheterization.  Had [facility] screening showed moderate carotid artery disease.  Did not have carotid ultrasound done at last visit due to scheduling issues.  I will reorder this  Continue aspirin, statin and metoprolol    Dyslipidemia  Patient brings in lab work today which showed LDL of 61.  Currently at goal.  Continue statin.    4. PAF  5. THUY  Maintaining normal sinus rhythm.  Continue with Xarelto for stroke prevention  Was unable to tolerate CPAP        This document has been @Electronically signed by REBECCA Begum, 11/20/24, 8:42 AM EST.       Dictated Utilizing Dragon Dictation: Part of this note may be an electronic transcription/translation of spoken language to printed text using the Dragon Dictation System.    Follow-up appointment and medication changes provided in hand delivered After Visit Summary as " well as reviewed in the room.

## 2024-11-21 ENCOUNTER — TELEPHONE (OUTPATIENT)
Dept: CARDIOLOGY | Facility: CLINIC | Age: 64
End: 2024-11-21
Payer: COMMERCIAL

## 2024-11-21 NOTE — TELEPHONE ENCOUNTER
Caller: AMALIA    Relationship: Other    Best call back number: [phone]     What is the best time to reach you: ANYTIME    Who are you requesting to speak with (clinical staff, provider,  specific staff member): PROVIDER    Do you know the name of the person who called: NA    What was the call regarding: AMALIA --  FROM [facility] CALLED REGARDING XARELTO ASSISTANCE PAPERWORK FAXED OVER TO THEIR OFFICE. SHE IS MISSING AN ICD CODE, COULD NOT VERIFY THAT WITH HER. SHE SAID IT'S ON PAGE 4 OF 14, IM NOT SEEING THAT IN THE FAX. PLEASE ADVISE.     Is it okay if the provider responds through [employer]: NO

## 2024-11-25 ENCOUNTER — TELEPHONE (OUTPATIENT)
Dept: CARDIOLOGY | Facility: CLINIC | Age: 64
End: 2024-11-25
Payer: COMMERCIAL

## 2024-11-25 NOTE — TELEPHONE ENCOUNTER
Provided patient with missing NPI number and advised she would have to address this with the place doing the application.  She verbalized understanding.

## 2024-11-25 NOTE — TELEPHONE ENCOUNTER
Pt called and said that they are missing everything on page 2 with the assistance program   Number Of Freeze-Thaw Cycles: 2 freeze-thaw cycles Render Post-Care Instructions In Note?: no Detail Level: Simple Show Applicator Variable?: Yes Consent: The patient's consent was obtained including but not limited to risks of crusting, scabbing, blistering, scarring, darker or lighter pigmentary change, recurrence, incomplete removal and infection. Duration Of Freeze Thaw-Cycle (Seconds): 5 Post-Care Instructions: I reviewed with the patient in detail post-care instructions. Patient is to wear sunprotection, and avoid picking at any of the treated lesions. Pt may apply Vaseline to crusted or scabbing areas. Detail Level: Zone Total Number Of Aks Treated: 18

## 2024-12-05 ENCOUNTER — HOSPITAL ENCOUNTER (OUTPATIENT)
Facility: HOSPITAL | Age: 64
Discharge: HOME | End: 2024-12-05
Admitting: NURSE PRACTITIONER
Payer: COMMERCIAL

## 2024-12-05 DIAGNOSIS — I65.23 BILATERAL CAROTID ARTERY STENOSIS: ICD-10-CM

## 2024-12-05 PROCEDURE — 93880 EXTRACRANIAL BILAT STUDY: CPT

## 2024-12-09 ENCOUNTER — TELEPHONE (OUTPATIENT)
Dept: CARDIOLOGY | Facility: CLINIC | Age: 64
End: 2024-12-09

## 2024-12-09 NOTE — TELEPHONE ENCOUNTER
Hub staff attempted to follow warm transfer process and was unsuccessful     Caller: Annmarie Hopkins    Relationship to patient: Self    Best call back number: [phone]    Patient is needing: REQUESTING A PRERNA NUMBER ON [employer]URYN BULLARD FOR [employer] FOR MEDICATION ASSISTANCE.

## 2025-04-08 ENCOUNTER — HOSPITAL ENCOUNTER (OUTPATIENT)
Dept: GENERAL RADIOLOGY | Facility: HOSPITAL | Age: 65
Discharge: HOME | End: 2025-04-08
Payer: COMMERCIAL

## 2025-04-08 ENCOUNTER — TRANSCRIBE ORDERS (OUTPATIENT)
Dept: ADMINISTRATIVE | Facility: HOSPITAL | Age: 65
End: 2025-04-08
Payer: COMMERCIAL

## 2025-04-08 DIAGNOSIS — R10.84 ABDOMINAL PAIN, GENERALIZED: ICD-10-CM

## 2025-04-08 DIAGNOSIS — R10.84 ABDOMINAL PAIN, GENERALIZED: Primary | ICD-10-CM

## 2025-04-08 PROCEDURE — 74018 RADEX ABDOMEN 1 VIEW: CPT

## 2025-04-08 PROCEDURE — 74018 RADEX ABDOMEN 1 VIEW: CPT | Performed by: RADIOLOGY

## 2025-04-14 DIAGNOSIS — I48.0 PAROXYSMAL ATRIAL FIBRILLATION: ICD-10-CM

## 2025-04-18 ENCOUNTER — OFFICE VISIT (OUTPATIENT)
Dept: UROLOGY | Facility: CLINIC | Age: 65
End: 2025-04-18
Payer: COMMERCIAL

## 2025-04-18 VITALS
DIASTOLIC BLOOD PRESSURE: 81 MMHG | SYSTOLIC BLOOD PRESSURE: 132 MMHG | HEART RATE: 72 BPM | WEIGHT: 135 LBS | BODY MASS INDEX: 23.05 KG/M2 | HEIGHT: 64 IN

## 2025-04-18 DIAGNOSIS — N20.0 BILATERAL NEPHROLITHIASIS: ICD-10-CM

## 2025-04-18 DIAGNOSIS — N30.01 ACUTE CYSTITIS WITH HEMATURIA: Primary | ICD-10-CM

## 2025-04-18 LAB
BILIRUB BLD-MCNC: NEGATIVE MG/DL
CLARITY, POC: CLEAR
COLOR UR: YELLOW
EXPIRATION DATE: ABNORMAL
GLUCOSE UR STRIP-MCNC: NEGATIVE MG/DL
KETONES UR QL: NEGATIVE
LEUKOCYTE EST, POC: ABNORMAL
Lab: ABNORMAL
NITRITE UR-MCNC: NEGATIVE MG/ML
PH UR: 6 [PH] (ref 5–8)
PROT UR STRIP-MCNC: NEGATIVE MG/DL
RBC # UR STRIP: ABNORMAL /UL
SP GR UR: 1.01 (ref 1–1.03)
UROBILINOGEN UR QL: NORMAL

## 2025-04-18 PROCEDURE — 87086 URINE CULTURE/COLONY COUNT: CPT

## 2025-04-18 RX ORDER — ROSUVASTATIN CALCIUM 40 MG/1
40 TABLET, COATED ORAL DAILY
Qty: 90 TABLET | Refills: 3 | Status: SHIPPED | OUTPATIENT
Start: 2025-04-18

## 2025-04-18 NOTE — PROGRESS NOTES
"Chief Complaint:    Chief Complaint   Patient presents with    Cystitis    Urinary Frequency     6 month        Vital Signs:   /81 (BP Location: Right arm, Patient Position: Sitting)   Pulse 72   Ht 162.6 cm (64.02\")   Wt 61.2 kg (135 lb)   BMI 23.16 kg/m²   Body mass index is 23.16 kg/m².      HPI:  Annmarie Hopkins is a 64 y.o. female who presents today for follow up    History of Present Illness  Ms. Hopkins returns to the clinic today for follow-up for gross hematuria, bilateral nephrolithiasis, and acute urinary tract infection.  She was last seen in office in November 2024 by Griselda Cheng-Akwa APRN.  She did have a CT scan completed in October 2024 which revealed 10 punctate nonobstructing stones in the right kidney and roughly 7 in the left.  She reports that in March she began to have some intermittent back and flank pain as well as frequency, urgency, and gross hematuria.  It is to note she currently takes aspirin and Xarelto.  She reports she followed up with her primary care provider due to concerns of an infection and had a urine culture completed on 3/25/2024 that came back with Staphylococcus aureus.  She was given appropriate antibiotic therapy and had a repeat urine culture on 4/8/2025 that showed no growth.  She reports she has not had any hematuria since receiving her antibiotics.  She denies any back or flank pain at this time.  She did have a KUB completed on 4/8/2025 that showed faint calcifications projecting over her kidneys most consistent with the previous nephrolithiasis.  These were all still punctate and easily passable.  Her urine in office today shows only 1+ blood and 1+ leukocytes.  Otherwise she is doing better.      Past Medical History:  Past Medical History:   Diagnosis Date    Abdominal fibromatosis     Arthritis     Hypothyroid     Sleep apnea        Current Meds:  Current Outpatient Medications   Medication Sig Dispense Refill    alendronate (FOSAMAX) 70 MG tablet " Take 1 tablet by mouth Every 7 (Seven) Days.      ascorbic acid (VITAMIN C) 500 MG tablet Take 1 tablet by mouth Daily.      aspirin 81 MG EC tablet Take 1 tablet by mouth Daily.      Calcium Carb-Cholecalciferol (CALCIUM 600+D3) 600-200 MG-UNIT tablet Take  by mouth.      clobetasol (TEMOVATE) 0.05 % ointment       Diclofenac Sodium (VOLTAREN) 1 % gel gel       levocetirizine (XYZAL) 5 MG tablet TAKE 1 2 TO 1 TABLET BY MOUTH EVERY EVENING  5    levothyroxine (SYNTHROID, LEVOTHROID) 88 MCG tablet       metoprolol tartrate (LOPRESSOR) 50 MG tablet Take 1 tablet by mouth As Needed (Heart rate). Take metoprolol the night before and 1 hour prior to CT scan.  Take 1 tablet if heart rate <70 bpm.  Take 2 tablets if heart rate > 70 bpm 3 tablet 1    montelukast (SINGULAIR) 10 MG tablet Take 1 tablet by mouth Daily.      multivitamin (MULTI-VITAMIN DAILY PO) Take  by mouth Daily.      rivaroxaban (XARELTO) 20 MG tablet Take 1 tablet by mouth Daily. LOT [id number]  Expt 10/2025 14 tablet 0    tamsulosin (FLOMAX) 0.4 MG capsule 24 hr capsule Take 1 capsule by mouth Daily.      triamcinolone (KENALOG) 0.5 % cream       Turmeric 500 MG capsule Take  by mouth.      rosuvastatin (CRESTOR) 40 MG tablet Take 1 tablet by mouth Daily. 90 tablet 3     No current facility-administered medications for this visit.        Allergies:   Allergies   Allergen Reactions    Codeine Unknown - Low Severity and Shortness Of Breath        Past Surgical History:  Past Surgical History:   Procedure Laterality Date    LAPAROSCOPIC TUBAL LIGATION      THYROIDECTOMY  2014       Social History:  Social History     Socioeconomic History    Marital status:    Tobacco Use    Smoking status: Never     Passive exposure: Never    Smokeless tobacco: Never   Vaping Use    Vaping status: Never Used   Substance and Sexual Activity    Alcohol use: Never    Drug use: Never    Sexual activity: Defer       Family History:  Family History   Problem Relation Age of  Onset    Cancer Mother     Hypertension Father     Heart disease Father     Diabetes Father     Heart disease Paternal Grandfather     Breast cancer Paternal Cousin        Review of Systems:  Review of Systems   Constitutional:  Negative for chills, fatigue, fever and unexpected weight change.   Eyes:  Negative for discharge.   Respiratory:  Negative for cough, chest tightness, shortness of breath and wheezing.    Cardiovascular:  Negative for chest pain and leg swelling.   Gastrointestinal:  Negative for abdominal pain, constipation, diarrhea, nausea and vomiting.   Genitourinary:  Negative for difficulty urinating, dysuria, frequency, hematuria and urgency.   Musculoskeletal:  Positive for back pain. Negative for joint swelling.   Skin:  Negative for rash.   Neurological:  Negative for dizziness and headaches.   Psychiatric/Behavioral:  Negative for confusion and suicidal ideas.        Physical Exam:  Physical Exam  Constitutional:       General: She is not in acute distress.     Appearance: Normal appearance.   HENT:      Head: Normocephalic and atraumatic.      Nose: Nose normal.      Mouth/Throat:      Mouth: Mucous membranes are moist.   Eyes:      Conjunctiva/sclera: Conjunctivae normal.   Cardiovascular:      Rate and Rhythm: Normal rate and regular rhythm.      Pulses: Normal pulses.      Heart sounds: Normal heart sounds.   Pulmonary:      Effort: Pulmonary effort is normal.      Breath sounds: Normal breath sounds.   Abdominal:      General: Bowel sounds are normal.      Palpations: Abdomen is soft.   Musculoskeletal:         General: Normal range of motion.      Cervical back: Normal range of motion.   Skin:     General: Skin is warm.   Neurological:      General: No focal deficit present.      Mental Status: She is alert and oriented to person, place, and time.   Psychiatric:         Mood and Affect: Mood normal.         Behavior: Behavior normal.         Thought Content: Thought content normal.          Judgment: Judgment normal.             Recent Image (CT and/or KUB):   CT Abdomen and Pelvis: No results found for this or any previous visit.     CT Stone Protocol: Results for orders placed in visit on 10/01/24    CT Abdomen Pelvis Stone Protocol    Narrative  EXAM: CT ABDOMEN PELVIS STONE PROTOCOL-      TECHNIQUE: Multiple axial CT images were obtained from lung bases  through pubic symphysis WITHOUT administration of IV contrast.  Reformatted images in the coronal and/or sagittal plane(s) were  generated from the axial data set to facilitate diagnostic accuracy  and/or surgical planning.  Oral Contrast:NONE.    Radiation dose reduction techniques were utilized per ALARA protocol.  Automated exposure control was initiated through either or [employer] or  DoseRight software packages by  protocol.  DOSE:    Clinical information GROSS HEMATURIA /FLANK PAIN/ABD PAIN; R31.0-Gross  hematuria; N20.0-Calculus of kidney; R10.9-Unspecified abdominal pain;  N30.01-Acute cystitis with hematuria    Comparison 11/19/2023    FINDINGS:    Lower thorax: Clear. No effusions.    Abdomen:    Liver: Homogeneous. No focal hepatic mass or ductal dilatation.    Gallbladder: No dilation or stone identified.    Pancreas: Unremarkable. No mass or ductal dilatation.    Spleen: Homogeneous. No splenomegaly.    Adrenals: No mass.    Kidneys/ureters: There are nonobstructing stones in both kidneys    GI tract: Large stool burden. There is no evidence of appendicitis    MESENTERY: No free fluid, walled off fluid collections, mesenteric  stranding, or enlarged lymph nodes      Vasculature: No evidence of aneurysm.    Abdominal wall: No focal hernia or mass.      Bladder: No focal mass or significant wall thickening    Reproductive: Tubal ligation clip in the midline just inferior to the  uterus and tubal ligation clip in the left hemipelvis.    Bones: No acute bony abnormality.    Impression  1. Nonobstructing stones in both  kidneys    2. Large stool burden    3. Other findings as above                  This report was finalized on 10/9/2024 10:33 AM by Dr. Félix Cuenca MD.     KUB: Results for orders placed during the hospital encounter of 04/08/25    XR Abdomen KUB    Narrative  EXAM:  XR Abdomen, 1 View    EXAM DATE:  4/8/2025 12:50 PM    CLINICAL HISTORY:  R10.84; R10.84-Generalized abdominal pain    TECHNIQUE:  Frontal supine view of the abdomen/pelvis.    COMPARISON:  9/13/2024    FINDINGS:  Gastrointestinal tract:  Mild volume fecal retention in the colon  consistent with constipation.  No dilation.  Organs:  Faint calcifications project over both kidneys.  Bones/joints:  Unremarkable.  No acute fracture.    Impression  1.  Faint calcifications project over both kidneys and may represent  kidney stones.  2.  Mild volume fecal retention in the colon consistent with  constipation.    This report was finalized on 4/8/2025 12:58 PM by Dr. Kyle Lacey MD.       Labs:  Brief Urine Lab Results  (Last result in the past 365 days)        Color   Clarity   Blood   Leuk Est   Nitrite   Protein   CREAT   Urine HCG        04/18/25 0940 Yellow   Clear   1+   Trace   Negative   Negative                 Office Visit on 04/18/2025   Component Date Value Ref Range Status    Color 04/18/2025 Yellow  Yellow, Straw, Dark Yellow, Haley Final    Clarity, UA 04/18/2025 Clear  Clear Final    Specific Gravity  04/18/2025 1.015  1.005 - 1.030 Final    pH, Urine 04/18/2025 6.0  5.0 - 8.0 Final    Leukocytes 04/18/2025 Trace (A)  Negative Final    Nitrite, UA 04/18/2025 Negative  Negative Final    Protein, POC 04/18/2025 Negative  Negative mg/dL Final    Glucose, UA 04/18/2025 Negative  Negative mg/dL Final    Ketones, UA 04/18/2025 Negative  Negative Final    Urobilinogen, UA 04/18/2025 Normal  Normal, 0.2 E.U./dL Final    Bilirubin 04/18/2025 Negative  Negative Final    Blood, UA 04/18/2025 1+ (A)  Negative Final    Lot Number 04/18/2025 [id number]    Final    Expiration Date 04/18/2025 52,026   Final        Procedure: None  Procedures     I have reviewed and agree with the above PMH, PSH, FMH, social history, medications, allergies, and labs.     Assessment/Plan:   Problem List Items Addressed This Visit       Bilateral nephrolithiasis    Acute cystitis with hematuria - Primary    Relevant Orders    POC Urinalysis Dipstick, Automated (Completed)    Urine Culture - Urine, Urine, Clean Catch       Health Maintenance:   Health Maintenance Due   Topic Date Due    PAP SMEAR  Never done    COLORECTAL CANCER SCREENING  Never done    Pneumococcal Vaccine 50+ (1 of 1 - PCV) Never done    HEPATITIS C SCREENING  Never done    ANNUAL PHYSICAL  Never done    ZOSTER VACCINE (2 of 2) 03/22/2022    COVID-19 Vaccine (4 - 2024-25 season) 09/01/2024        Smoking Counseling: Never smoked.  Never used smokeless tobacco.    Urine Incontinence: Patient reports that she is not currently experiencing any symptoms of urinary incontinence.    Patient was given instructions and counseling regarding her condition or for health maintenance advice. Please see specific information pulled into the AVS if appropriate.    Patient Education:   Acute cystitis/urinary tract infection -discussed with the patient recent urine culture showing Staphylococcus.  She has received appropriate antibiotics with a negative repeat urine culture on 4 8.  Recommended continue with observation and a daily probiotic to help with growth controlled normal urogenital may.  Given leukocytes and blood on urinalysis I did send this off for culture rule out repeat infection.  Will hold off on antibiotics given patient is asymptomatic at this time.  Advised to go to the nearest ER if she has worsening symptoms.  Nephrolithiasis -discussed with the patient previous CT scan revealing several bilateral punctate kidney stones as well as x-ray confirming this on 4/8/2024.  And recommended start Flomax 0.4 mg to help with  the passage of a kidney stones.  Did discuss the risk of these medications in detail including hypotension advised to discontinue if she begins to experience lightheadedness, dizziness, blurry vision, chest pain, or shortness of breath.  Otherwise we will keep a close follow-up in have her return in 6 months or sooner if needed    Visit Diagnoses:    ICD-10-CM ICD-9-CM   1. Acute cystitis with hematuria  N30.01 595.0   2. Bilateral nephrolithiasis  N20.0 592.0     A total of 30 minutes were spent coordinating this patient’s care in clinic today; 20 minutes of which were face-to-face with the patient, reviewing medical history and counseling on the current treatment and followup plan.  All questions were answered to patient's satisfaction.    Meds Ordered During Visit:  No orders of the defined types were placed in this encounter.      Follow Up Appointment: 6 months  No follow-ups on file.      This document has been electronically signed by Soy Hernandez PA-C   April 18, 2025 15:44 EDT    Part of this note may be an electronic transcription/translation of spoken language to printed text using the Dragon Dictation System.

## 2025-04-18 NOTE — TELEPHONE ENCOUNTER
Called and spoke with patient, she said she actually has a copy of her recent lab work she had completed in February. She said she will drop it off after she leaves her urology appointment today.

## 2025-04-19 LAB — BACTERIA SPEC AEROBE CULT: NO GROWTH

## 2025-04-21 ENCOUNTER — RESULTS FOLLOW-UP (OUTPATIENT)
Dept: UROLOGY | Facility: CLINIC | Age: 65
End: 2025-04-21
Payer: COMMERCIAL

## 2025-04-28 DIAGNOSIS — I48.0 PAROXYSMAL ATRIAL FIBRILLATION: ICD-10-CM

## 2025-04-30 RX ORDER — RIVAROXABAN 20 MG/1
20 TABLET, FILM COATED ORAL DAILY
Qty: 90 TABLET | Refills: 0 | Status: SHIPPED | OUTPATIENT
Start: 2025-04-30

## 2025-05-12 ENCOUNTER — TELEPHONE (OUTPATIENT)
Dept: CARDIOLOGY | Facility: CLINIC | Age: 65
End: 2025-05-12
Payer: COMMERCIAL

## 2025-05-12 DIAGNOSIS — E78.5 DYSLIPIDEMIA: ICD-10-CM

## 2025-05-12 DIAGNOSIS — I25.10 ASCVD (ARTERIOSCLEROTIC CARDIOVASCULAR DISEASE): ICD-10-CM

## 2025-05-12 DIAGNOSIS — I48.0 PAROXYSMAL ATRIAL FIBRILLATION: Primary | ICD-10-CM

## 2025-05-12 NOTE — TELEPHONE ENCOUNTER
Hub to relay.     Called pt to advise her that [facility] has ordered labs and she will need to fast the night before. No answer KENDRICK.

## 2025-05-16 ENCOUNTER — LAB (OUTPATIENT)
Dept: LAB | Facility: HOSPITAL | Age: 65
End: 2025-05-16
Payer: COMMERCIAL

## 2025-05-16 DIAGNOSIS — I48.0 PAROXYSMAL ATRIAL FIBRILLATION: ICD-10-CM

## 2025-05-16 DIAGNOSIS — I25.10 ASCVD (ARTERIOSCLEROTIC CARDIOVASCULAR DISEASE): ICD-10-CM

## 2025-05-16 DIAGNOSIS — E78.5 DYSLIPIDEMIA: ICD-10-CM

## 2025-05-16 LAB
ALBUMIN SERPL-MCNC: 4.3 G/DL (ref 3.5–5.2)
ALBUMIN/GLOB SERPL: 1.7 G/DL
ALP SERPL-CCNC: 78 U/L (ref 39–117)
ALT SERPL W P-5'-P-CCNC: 21 U/L (ref 1–33)
ANION GAP SERPL CALCULATED.3IONS-SCNC: 11.1 MMOL/L (ref 5–15)
AST SERPL-CCNC: 22 U/L (ref 1–32)
BILIRUB SERPL-MCNC: 0.5 MG/DL (ref 0–1.2)
BUN SERPL-MCNC: 13 MG/DL (ref 8–23)
BUN/CREAT SERPL: 16.9 (ref 7–25)
CALCIUM SPEC-SCNC: 9.2 MG/DL (ref 8.6–10.5)
CHLORIDE SERPL-SCNC: 109 MMOL/L (ref 98–107)
CHOLEST SERPL-MCNC: 102 MG/DL (ref 0–200)
CO2 SERPL-SCNC: 21.9 MMOL/L (ref 22–29)
CREAT SERPL-MCNC: 0.77 MG/DL (ref 0.57–1)
EGFRCR SERPLBLD CKD-EPI 2021: 86.8 ML/MIN/1.73
GLOBULIN UR ELPH-MCNC: 2.6 GM/DL
GLUCOSE SERPL-MCNC: 85 MG/DL (ref 65–99)
HDLC SERPL-MCNC: 41 MG/DL (ref 40–60)
LDLC SERPL CALC-MCNC: 48 MG/DL (ref 0–100)
LDLC/HDLC SERPL: 1.2 {RATIO}
POTASSIUM SERPL-SCNC: 3.7 MMOL/L (ref 3.5–5.2)
PROT SERPL-MCNC: 6.9 G/DL (ref 6–8.5)
SODIUM SERPL-SCNC: 142 MMOL/L (ref 136–145)
TRIGL SERPL-MCNC: 59 MG/DL (ref 0–150)
VLDLC SERPL-MCNC: 13 MG/DL (ref 5–40)

## 2025-05-16 PROCEDURE — 80053 COMPREHEN METABOLIC PANEL: CPT

## 2025-05-16 PROCEDURE — 36415 COLL VENOUS BLD VENIPUNCTURE: CPT

## 2025-05-16 PROCEDURE — 80061 LIPID PANEL: CPT

## 2025-05-20 ENCOUNTER — OFFICE VISIT (OUTPATIENT)
Dept: CARDIOLOGY | Facility: CLINIC | Age: 65
End: 2025-05-20
Payer: COMMERCIAL

## 2025-05-20 VITALS
HEIGHT: 64 IN | DIASTOLIC BLOOD PRESSURE: 67 MMHG | OXYGEN SATURATION: 100 % | BODY MASS INDEX: 22.88 KG/M2 | HEART RATE: 72 BPM | WEIGHT: 134 LBS | SYSTOLIC BLOOD PRESSURE: 99 MMHG

## 2025-05-20 DIAGNOSIS — I25.10 ASCVD (ARTERIOSCLEROTIC CARDIOVASCULAR DISEASE): ICD-10-CM

## 2025-05-20 DIAGNOSIS — I48.0 PAROXYSMAL ATRIAL FIBRILLATION: ICD-10-CM

## 2025-05-20 DIAGNOSIS — E78.5 DYSLIPIDEMIA: ICD-10-CM

## 2025-05-20 DIAGNOSIS — G47.33 OSA (OBSTRUCTIVE SLEEP APNEA): Primary | ICD-10-CM

## 2025-05-20 PROCEDURE — 99214 OFFICE O/P EST MOD 30 MIN: CPT | Performed by: NURSE PRACTITIONER

## 2025-05-20 PROCEDURE — 93000 ELECTROCARDIOGRAM COMPLETE: CPT | Performed by: NURSE PRACTITIONER

## 2025-05-20 NOTE — PROGRESS NOTES
[facility]             UrsulaREBECCA [place], [place]  Annmarie Hopkins  1960  05/20/2025    Patient Active Problem List   Diagnosis    Dysuria-frequency syndrome    Bladder spasms    Retention of urine    Paroxysmal atrial fibrillation    Atherosclerosis of abdominal aorta    Dyslipidemia    THUY (obstructive sleep apnea)    Syncope and collapse    Shortness of breath    Chronic bladder pain    ASCVD (arteriosclerotic cardiovascular disease)    Gross hematuria    Bilateral nephrolithiasis    Frequency of micturition    Bilateral flank pain    Acute cystitis with hematuria       Dear Hakan Jordan PA:    Subjective     Chief Complaint   Patient presents with    Follow-up     6 month       HPI:     This is a 64 y.o. female with known past medical history of paroxysmal atrial fibrillation, dyslipidemia, obstructive sleep apnea.        Annmarie Hopkins presents today for routine cardiology follow up.   History of Present Illness    She reports no current chest pain. She has been adhering to a diet devoid of deep-fried foods and has incorporated lemon water into her regimen, which she believes aids in calcium dilution. She has expressed interest in understanding the potential benefits of olive oil, vinegar, and lemon juice in her diet. She has also been researching dietary modifications to increase her HDL levels. She is currently on aspirin and a cholesterol-lowering medication.    She experienced a syncopal episode in 03/2025, which was preceded by severe pain. On the day of the episode, she had consumed hot tea in the morning and half of a vanilla shake around 3:30 PM but had not eaten anything else. She sought medical attention the following day, suspecting low potassium levels. Her potassium level was recorded as 3.7. She has been consuming prunes daily, two in the morning and two at night, to maintain her potassium levels. She recalls a similar  episode in 11/2024 when she experienced four simultaneous cramps.  She does believe that syncopal episode was related to severe pain.    Manage well-controlled.  Recent lab work showed stable kidney function electrolytes.  LDL is at goal.  Maintaining normal sinus rhythm.  Xarelto is now affordable for her.  Had carotid ultrasound which showed mild plaque bilaterally with no significant stenosis     Diagnostic Testing  Cardiac event monitor 12/2023: Predominant normal sinus rhythm with episodes of sinus bradycardia and rare PVCs  Echocardiogram 1/2023: EF 61 to 65%  Nuclear stress test 1/2023: No evidence of ischemia  CT calcium score 2/2024: 338  CT coronary angiogram 5/2024: Total calcium score of 359 with minimal to moderate stenosis noted in left main, LAD, left circumflex and right coronary artery       All other systems were reviewed and were negative.    Patient Active Problem List   Diagnosis    Dysuria-frequency syndrome    Bladder spasms    Retention of urine    Paroxysmal atrial fibrillation    Atherosclerosis of abdominal aorta    Dyslipidemia    THUY (obstructive sleep apnea)    Syncope and collapse    Shortness of breath    Chronic bladder pain    ASCVD (arteriosclerotic cardiovascular disease)    Gross hematuria    Bilateral nephrolithiasis    Frequency of micturition    Bilateral flank pain    Acute cystitis with hematuria       family history includes Breast cancer in her paternal cousin; Cancer in her mother; Diabetes in her father; Heart disease in her father and paternal grandfather; Hypertension in her father.     reports that she has never smoked. She has never been exposed to tobacco smoke. She has never used smokeless tobacco. She reports that she does not drink alcohol and does not use drugs.    Allergies   Allergen Reactions    Codeine Unknown - Low Severity and Shortness Of Breath         Current Outpatient Medications:     alendronate (FOSAMAX) 70 MG tablet, Take 1 tablet by mouth Every 7  (Seven) Days., Disp: , Rfl:     ascorbic acid (VITAMIN C) 500 MG tablet, Take 1 tablet by mouth Daily., Disp: , Rfl:     aspirin 81 MG EC tablet, Take 1 tablet by mouth Daily., Disp: , Rfl:     clobetasol (TEMOVATE) 0.05 % ointment, , Disp: , Rfl:     Diclofenac Sodium (VOLTAREN) 1 % gel gel, , Disp: , Rfl:     levocetirizine (XYZAL) 5 MG tablet, TAKE 1 2 TO 1 TABLET BY MOUTH EVERY EVENING, Disp: , Rfl: 5    levothyroxine (SYNTHROID, LEVOTHROID) 88 MCG tablet, , Disp: , Rfl:     montelukast (SINGULAIR) 10 MG tablet, Take 1 tablet by mouth Daily., Disp: , Rfl:     multivitamin (MULTI-VITAMIN DAILY PO), Take  by mouth Daily., Disp: , Rfl:     rosuvastatin (CRESTOR) 40 MG tablet, Take 1 tablet by mouth Daily., Disp: 90 tablet, Rfl: 3    tamsulosin (FLOMAX) 0.4 MG capsule 24 hr capsule, Take 1 capsule by mouth Daily., Disp: , Rfl:     triamcinolone (KENALOG) 0.5 % cream, , Disp: , Rfl:     Turmeric 500 MG capsule, Take  by mouth., Disp: , Rfl:     Xarelto 20 MG tablet, Take 1 tablet by mouth once daily, Disp: 90 tablet, Rfl: 0    Calcium Carb-Cholecalciferol (CALCIUM 600+D3) 600-200 MG-UNIT tablet, Take  by mouth. (Patient not taking: Reported on 5/20/2025), Disp: , Rfl:       Physical Exam:  I have reviewed the patient's current vital signs as documented in the patient's EMR.   Vitals:    05/20/25 0945   BP: 99/67   Pulse: 72   SpO2: 100%     Body mass index is 22.99 kg/m².       05/20/25  0945   Weight: 60.8 kg (134 lb)      Physical Exam  Constitutional:       General: She is not in acute distress.     Appearance: Normal appearance. She is well-developed and normal weight.   HENT:      Head: Normocephalic and atraumatic.   Eyes:      General: Lids are normal.      Conjunctiva/sclera: Conjunctivae normal.      Pupils: Pupils are equal, round, and reactive to light.   Neck:      Vascular: No carotid bruit or JVD.   Cardiovascular:      Rate and Rhythm: Normal rate and regular rhythm.      Pulses: Normal pulses.       Heart sounds: Normal heart sounds, S1 normal and S2 normal. No murmur heard.  Pulmonary:      Effort: Pulmonary effort is normal. No respiratory distress.      Breath sounds: Normal breath sounds. No wheezing.   Abdominal:      General: Bowel sounds are normal. There is no distension.      Palpations: Abdomen is soft. There is no hepatomegaly or splenomegaly.      Tenderness: There is no abdominal tenderness.   Musculoskeletal:         General: No swelling. Normal range of motion.      Cervical back: Normal range of motion and neck supple.      Right lower leg: No edema.      Left lower leg: No edema.   Skin:     General: Skin is warm and dry.      Coloration: Skin is not jaundiced.      Findings: No rash.   Neurological:      General: No focal deficit present.      Mental Status: She is alert and oriented to person, place, and time. Mental status is at baseline.   Psychiatric:         Mood and Affect: Mood normal.         Speech: Speech normal.         Behavior: Behavior normal.         Thought Content: Thought content normal.         Judgment: Judgment normal.         DATA REVIEWED:     TTE/HUBERT:  Results for orders placed during the hospital encounter of 01/26/24    Adult Transthoracic Echo Complete w/ Color, Spectral and Contrast if necessary per protocol    Interpretation Summary    Left ventricular systolic function is normal. Left ventricular ejection fraction appears to be 61 - 65%.    Left ventricular diastolic function is consistent with (grade I) impaired relaxation.    Estimated right ventricular systolic pressure from tricuspid regurgitation is normal (<35 mmHg).      Laboratory evaluations:    Lab Results   Component Value Date    GLUCOSE 85 05/16/2025    BUN 13 05/16/2025    CREATININE 0.77 05/16/2025    EGFRIFAFRI 101 05/07/2024    BCR 16.9 05/16/2025    K 3.7 05/16/2025    CO2 21.9 (L) 05/16/2025    CALCIUM 9.2 05/16/2025    ALBUMIN 4.3 05/16/2025    AST 22 05/16/2025    ALT 21 05/16/2025     Lab  "Results   Component Value Date    WBC 8.55 06/01/2024    HGB 13.7 06/01/2024    HCT 40.5 06/01/2024    MCV 90.4 06/01/2024     06/01/2024     Lab Results   Component Value Date    CHOL 102 05/16/2025    TRIG 59 05/16/2025    HDL 41 05/16/2025    LDL 48 05/16/2025     No results found for: \"TSH\", \"R7GOZNB\", \"I0VBEPW\", \"THYROIDAB\"  No results found for: \"HGBA1C\"  Lab Results   Component Value Date    ALT 21 05/16/2025     No results found for: \"HGBA1C\"  Lab Results   Component Value Date    CREATININE 0.77 05/16/2025     No results found for: \"IRON\", \"TIBC\", \"FERRITIN\"  Lab Results   Component Value Date    INR <0.90 04/03/2014    PROTIME 9.9 04/03/2014      No components found for: \"ABSOLUTELUNG\"    ECG 12 Lead    Date/Time: 5/20/2025 10:13 AM  Performed by: Ursula Marlwo APRN    Authorized by: Ursula Marlow APRN  Comparison: compared with previous ECG   Rhythm: sinus rhythm  Rate: normal  Conduction: incomplete right bundle branch block  Other findings: non-specific ST-T wave changes    Clinical impression: non-specific ECG         --------------------------------------------------------------------------------------------------------------------------    ASSESSMENT/PLAN:      Diagnosis Plan   1. THUY (obstructive sleep apnea)        2. Dyslipidemia        3. ASCVD (arteriosclerotic cardiovascular disease)  ECG 12 Lead      4. Paroxysmal atrial fibrillation  ECG 12 Lead        Assessment & Plan  ASCVD  Carotid artery disease  CT coronary angiogram showed total calcium score of 359.  Noted multivessel atherosclerotic coronary artery disease with areas of mild to moderate stenosis (30-50%) due to mixed calcified and non-calcified plaque in the proximal and mid LAD that have a low likelihood of flow-limitation with an FFRct value of 0.88. Additionally, there is probably moderate (50-70%) stenosis in the ostium of the first diagonal branch, but the vessel is not large enough for FFRct " evaluation. Patient is currently asymptomatic.  We discussed continue with medical management for now and if she develops symptoms we will proceed with left heart catheterization.  Had [facility] screening showed moderate carotid artery disease.  We did obtain  carotid ultrasound which showed mild plaque bilaterally.  Continue with aspirin and statin.    3.  Dyslipidemia  Recent lipid panel showed LDL at goal.  Continue statin.    4.  PAF  5.  THUY  Maintaining normal sinus rhythm.  Continue with Xarelto for stroke prevention  Was unable to tolerate CPAP      This document has been @Electronically signed by REBECCA Begum, 05/20/25, 8:35 AM EDT.     Dictated Utilizing Dragon Dictation: Part of this note may be an electronic transcription/translation of spoken language to printed text using the Dragon Dictation System. Patient or patient representative verbalized consent for the use of Ambient Listening during the visit with  REBECCA Begum for chart documentation. 5/20/2025  10:09 EDT    Follow-up appointment and medication changes provided in hand delivered After Visit Summary as well as reviewed in the room.

## 2025-05-28 ENCOUNTER — HOSPITAL ENCOUNTER (OUTPATIENT)
Dept: MAMMOGRAPHY | Facility: HOSPITAL | Age: 65
Discharge: HOME | End: 2025-05-28
Payer: COMMERCIAL

## 2025-05-28 DIAGNOSIS — Z12.31 VISIT FOR SCREENING MAMMOGRAM: ICD-10-CM

## 2025-05-28 PROCEDURE — 77063 BREAST TOMOSYNTHESIS BI: CPT

## 2025-05-28 PROCEDURE — 77067 SCR MAMMO BI INCL CAD: CPT

## 2025-06-13 ENCOUNTER — TELEPHONE (OUTPATIENT)
Dept: UROLOGY | Facility: CLINIC | Age: 65
End: 2025-06-13
Payer: COMMERCIAL

## 2025-06-13 NOTE — TELEPHONE ENCOUNTER
Provider: RABIA SINGER    Caller: JANIA HONEYCUTT    Relationship to Patient: SELF    Reason for Call: PATIENT IS WORKING ON DISABILITY CLAIM . SHE HAS A HEARING ON JULY 15TH. SHE NEEDS SOMETHING IN WRITING TO PRESENT TO HER  ABOUT HER UROLOGIC CONDITION. PLEASE REACH OUT TO DISCUSS.    CELL IS BEST YOU MAY LEAVE A MESSAGE IF NO ANSWER     When was the patient last seen: 04-18-25

## 2025-06-18 ENCOUNTER — TRANSCRIBE ORDERS (OUTPATIENT)
Dept: ADMINISTRATIVE | Facility: HOSPITAL | Age: 65
End: 2025-06-18
Payer: COMMERCIAL

## 2025-06-18 DIAGNOSIS — Z13.820 SCREENING FOR OSTEOPOROSIS: Primary | ICD-10-CM

## 2025-06-28 ENCOUNTER — APPOINTMENT (OUTPATIENT)
Dept: CT IMAGING | Facility: HOSPITAL | Age: 65
End: 2025-06-28
Payer: COMMERCIAL

## 2025-06-28 ENCOUNTER — HOSPITAL ENCOUNTER (EMERGENCY)
Facility: HOSPITAL | Age: 65
Discharge: HOME | End: 2025-06-28
Payer: COMMERCIAL

## 2025-06-28 VITALS
OXYGEN SATURATION: 95 % | TEMPERATURE: 99.4 F | BODY MASS INDEX: 22.49 KG/M2 | HEIGHT: 65 IN | SYSTOLIC BLOOD PRESSURE: 125 MMHG | DIASTOLIC BLOOD PRESSURE: 69 MMHG | HEART RATE: 63 BPM | WEIGHT: 135 LBS | RESPIRATION RATE: 14 BRPM

## 2025-06-28 DIAGNOSIS — M51.369 BULGE OF LUMBAR DISC WITHOUT MYELOPATHY: Primary | ICD-10-CM

## 2025-06-28 LAB
ALBUMIN SERPL-MCNC: 4.6 G/DL (ref 3.5–5.2)
ALBUMIN/GLOB SERPL: 1.8 G/DL
ALP SERPL-CCNC: 89 U/L (ref 39–117)
ALT SERPL W P-5'-P-CCNC: 37 U/L (ref 1–33)
ANION GAP SERPL CALCULATED.3IONS-SCNC: 13.6 MMOL/L (ref 5–15)
AST SERPL-CCNC: 35 U/L (ref 1–32)
BACTERIA UR QL AUTO: ABNORMAL /HPF
BASOPHILS # BLD AUTO: 0.04 10*3/MM3 (ref 0–0.2)
BASOPHILS NFR BLD AUTO: 0.9 % (ref 0–1.5)
BILIRUB SERPL-MCNC: 0.6 MG/DL (ref 0–1.2)
BILIRUB UR QL STRIP: NEGATIVE
BUN SERPL-MCNC: 11.8 MG/DL (ref 8–23)
BUN/CREAT SERPL: 16.6 (ref 7–25)
CALCIUM SPEC-SCNC: 9.2 MG/DL (ref 8.6–10.5)
CHLORIDE SERPL-SCNC: 101 MMOL/L (ref 98–107)
CLARITY UR: CLEAR
CO2 SERPL-SCNC: 23.4 MMOL/L (ref 22–29)
COLOR UR: YELLOW
CREAT SERPL-MCNC: 0.71 MG/DL (ref 0.57–1)
CRP SERPL-MCNC: 0.6 MG/DL (ref 0–0.5)
DEPRECATED RDW RBC AUTO: 43.3 FL (ref 37–54)
EGFRCR SERPLBLD CKD-EPI 2021: 95.1 ML/MIN/1.73
EOSINOPHIL # BLD AUTO: 0.05 10*3/MM3 (ref 0–0.4)
EOSINOPHIL NFR BLD AUTO: 1.1 % (ref 0.3–6.2)
ERYTHROCYTE [DISTWIDTH] IN BLOOD BY AUTOMATED COUNT: 13.1 % (ref 12.3–15.4)
GLOBULIN UR ELPH-MCNC: 2.6 GM/DL
GLUCOSE SERPL-MCNC: 89 MG/DL (ref 65–99)
GLUCOSE UR STRIP-MCNC: NEGATIVE MG/DL
HCT VFR BLD AUTO: 45.5 % (ref 34–46.6)
HGB BLD-MCNC: 15.2 G/DL (ref 12–15.9)
HGB UR QL STRIP.AUTO: NEGATIVE
HOLD SPECIMEN: NORMAL
HOLD SPECIMEN: NORMAL
HYALINE CASTS UR QL AUTO: ABNORMAL /LPF
IMM GRANULOCYTES # BLD AUTO: 0.02 10*3/MM3 (ref 0–0.05)
IMM GRANULOCYTES NFR BLD AUTO: 0.5 % (ref 0–0.5)
KETONES UR QL STRIP: NEGATIVE
LEUKOCYTE ESTERASE UR QL STRIP.AUTO: ABNORMAL
LYMPHOCYTES # BLD AUTO: 0.51 10*3/MM3 (ref 0.7–3.1)
LYMPHOCYTES NFR BLD AUTO: 11.5 % (ref 19.6–45.3)
MCH RBC QN AUTO: 30.2 PG (ref 26.6–33)
MCHC RBC AUTO-ENTMCNC: 33.4 G/DL (ref 31.5–35.7)
MCV RBC AUTO: 90.5 FL (ref 79–97)
MONOCYTES # BLD AUTO: 0.67 10*3/MM3 (ref 0.1–0.9)
MONOCYTES NFR BLD AUTO: 15.1 % (ref 5–12)
NEUTROPHILS NFR BLD AUTO: 3.14 10*3/MM3 (ref 1.7–7)
NEUTROPHILS NFR BLD AUTO: 70.9 % (ref 42.7–76)
NITRITE UR QL STRIP: NEGATIVE
NRBC BLD AUTO-RTO: 0 /100 WBC (ref 0–0.2)
PH UR STRIP.AUTO: 8.5 [PH] (ref 5–8)
PLATELET # BLD AUTO: 190 10*3/MM3 (ref 140–450)
PMV BLD AUTO: 10.2 FL (ref 6–12)
POTASSIUM SERPL-SCNC: 3.8 MMOL/L (ref 3.5–5.2)
PROT SERPL-MCNC: 7.2 G/DL (ref 6–8.5)
PROT UR QL STRIP: NEGATIVE
RBC # BLD AUTO: 5.03 10*6/MM3 (ref 3.77–5.28)
RBC # UR STRIP: ABNORMAL /HPF
REF LAB TEST METHOD: ABNORMAL
SODIUM SERPL-SCNC: 138 MMOL/L (ref 136–145)
SP GR UR STRIP: 1.01 (ref 1–1.03)
SQUAMOUS #/AREA URNS HPF: ABNORMAL /HPF
UROBILINOGEN UR QL STRIP: ABNORMAL
WBC # UR STRIP: ABNORMAL /HPF
WBC NRBC COR # BLD AUTO: 4.43 10*3/MM3 (ref 3.4–10.8)
WHOLE BLOOD HOLD COAG: NORMAL
WHOLE BLOOD HOLD SPECIMEN: NORMAL

## 2025-06-28 PROCEDURE — 36415 COLL VENOUS BLD VENIPUNCTURE: CPT

## 2025-06-28 PROCEDURE — 72131 CT LUMBAR SPINE W/O DYE: CPT | Performed by: RADIOLOGY

## 2025-06-28 PROCEDURE — 80053 COMPREHEN METABOLIC PANEL: CPT | Performed by: NURSE PRACTITIONER

## 2025-06-28 PROCEDURE — 99284 EMERGENCY DEPT VISIT MOD MDM: CPT

## 2025-06-28 PROCEDURE — 25810000003 SODIUM CHLORIDE 0.9 % SOLUTION: Performed by: NURSE PRACTITIONER

## 2025-06-28 PROCEDURE — 72131 CT LUMBAR SPINE W/O DYE: CPT

## 2025-06-28 PROCEDURE — 81001 URINALYSIS AUTO W/SCOPE: CPT | Performed by: NURSE PRACTITIONER

## 2025-06-28 PROCEDURE — 85025 COMPLETE CBC W/AUTO DIFF WBC: CPT | Performed by: NURSE PRACTITIONER

## 2025-06-28 PROCEDURE — 25010000002 ORPHENADRINE CITRATE PER 60 MG: Performed by: NURSE PRACTITIONER

## 2025-06-28 PROCEDURE — 96375 TX/PRO/DX INJ NEW DRUG ADDON: CPT

## 2025-06-28 PROCEDURE — 25010000002 KETOROLAC TROMETHAMINE PER 15 MG: Performed by: NURSE PRACTITIONER

## 2025-06-28 PROCEDURE — 96374 THER/PROPH/DIAG INJ IV PUSH: CPT

## 2025-06-28 PROCEDURE — 86140 C-REACTIVE PROTEIN: CPT | Performed by: NURSE PRACTITIONER

## 2025-06-28 RX ORDER — CYCLOBENZAPRINE HCL 10 MG
10 TABLET ORAL 3 TIMES DAILY PRN
Qty: 12 TABLET | Refills: 0 | Status: SHIPPED | OUTPATIENT
Start: 2025-06-28

## 2025-06-28 RX ORDER — ORPHENADRINE CITRATE 30 MG/ML
60 INJECTION INTRAMUSCULAR; INTRAVENOUS ONCE
Status: COMPLETED | OUTPATIENT
Start: 2025-06-28 | End: 2025-06-28

## 2025-06-28 RX ORDER — KETOROLAC TROMETHAMINE 30 MG/ML
30 INJECTION, SOLUTION INTRAMUSCULAR; INTRAVENOUS ONCE
Status: COMPLETED | OUTPATIENT
Start: 2025-06-28 | End: 2025-06-28

## 2025-06-28 RX ORDER — SODIUM CHLORIDE 0.9 % (FLUSH) 0.9 %
10 SYRINGE (ML) INJECTION AS NEEDED
Status: DISCONTINUED | OUTPATIENT
Start: 2025-06-28 | End: 2025-06-28 | Stop reason: HOSPADM

## 2025-06-28 RX ORDER — LIDOCAINE 50 MG/G
1 PATCH TOPICAL EVERY 24 HOURS
Qty: 5 PATCH | Refills: 0 | Status: SHIPPED | OUTPATIENT
Start: 2025-06-28

## 2025-06-28 RX ADMIN — KETOROLAC TROMETHAMINE 30 MG: 30 INJECTION INTRAMUSCULAR; INTRAVENOUS at 11:49

## 2025-06-28 RX ADMIN — SODIUM CHLORIDE 1000 ML: 9 INJECTION, SOLUTION INTRAVENOUS at 11:52

## 2025-06-28 RX ADMIN — ORPHENADRINE CITRATE 60 MG: 30 INJECTION, SOLUTION INTRAMUSCULAR; INTRAVENOUS at 11:47

## 2025-06-28 NOTE — DISCHARGE INSTRUCTIONS

## 2025-06-28 NOTE — ED PROVIDER NOTES
Subjective   History of Present Illness  Patient is a 65-year-old female with significant past medical history positive for arthritis, presenting to the ER complaints of low back pain and ear pain.  Patient denies any saddle numbness or loss of bowel or bladder.  Patient reports no injury.  Patient denies any dysuria or urinary frequency or difficulty urinating.  Patient denies any hematuria.  Patient report her ears are also hurting.  Patient had sinus pressure last week.  Patient patient reports the sinus pressure has improved.  Patient denies any additional symptoms at this time.    History provided by:  Patient   used: No        Review of Systems   Constitutional: Negative.  Negative for fever.   HENT:  Positive for ear pain.    Respiratory: Negative.     Cardiovascular: Negative.  Negative for chest pain.   Gastrointestinal: Negative.  Negative for abdominal pain.   Endocrine: Negative.    Genitourinary: Negative.  Negative for dysuria.   Musculoskeletal:  Positive for back pain.   Skin: Negative.    Neurological: Negative.    Psychiatric/Behavioral: Negative.     All other systems reviewed and are negative.      Past Medical History:   Diagnosis Date    Abdominal fibromatosis     Arthritis     Hypothyroid     Sleep apnea        Allergies   Allergen Reactions    Codeine Unknown - Low Severity and Shortness Of Breath       Past Surgical History:   Procedure Laterality Date    LAPAROSCOPIC TUBAL LIGATION      THYROIDECTOMY  2014       Family History   Problem Relation Age of Onset    Cancer Mother     Hypertension Father     Heart disease Father     Diabetes Father     Heart disease Paternal Grandfather     Breast cancer Paternal Cousin        Social History     Socioeconomic History    Marital status:    Tobacco Use    Smoking status: Never     Passive exposure: Never    Smokeless tobacco: Never   Vaping Use    Vaping status: Never Used   Substance and Sexual Activity    Alcohol use:  Never    Drug use: Never    Sexual activity: Defer           Objective   Physical Exam  Vitals and nursing note reviewed.   Constitutional:       General: She is not in acute distress.     Appearance: She is well-developed. She is not diaphoretic.   HENT:      Head: Normocephalic and atraumatic.      Right Ear: External ear normal.      Left Ear: External ear normal.      Nose: Nose normal.      Mouth/Throat:      Pharynx: No oropharyngeal exudate or posterior oropharyngeal erythema.   Eyes:      Conjunctiva/sclera: Conjunctivae normal.      Pupils: Pupils are equal, round, and reactive to light.   Neck:      Vascular: No JVD.      Trachea: No tracheal deviation.   Cardiovascular:      Rate and Rhythm: Normal rate and regular rhythm.      Heart sounds: Normal heart sounds. No murmur heard.  Pulmonary:      Effort: Pulmonary effort is normal. No respiratory distress.      Breath sounds: Normal breath sounds. No wheezing.   Abdominal:      General: Bowel sounds are normal.      Palpations: Abdomen is soft.      Tenderness: There is no abdominal tenderness.   Musculoskeletal:         General: Tenderness present. No deformity. Normal range of motion.      Cervical back: Normal range of motion and neck supple.   Skin:     General: Skin is warm and dry.      Coloration: Skin is not pale.      Findings: No erythema or rash.   Neurological:      Mental Status: She is alert and oriented to person, place, and time.      Cranial Nerves: No cranial nerve deficit.   Psychiatric:         Behavior: Behavior normal.         Thought Content: Thought content normal.         Procedures       Results for orders placed or performed during the hospital encounter of 06/28/25   Comprehensive Metabolic Panel    Collection Time: 06/28/25 11:42 AM    Specimen: Arm, Right; Blood   Result Value Ref Range    Glucose 89 65 - 99 mg/dL    BUN 11.8 8.0 - 23.0 mg/dL    Creatinine 0.71 0.57 - 1.00 mg/dL    Sodium 138 136 - 145 mmol/L    Potassium 3.8  3.5 - 5.2 mmol/L    Chloride 101 98 - 107 mmol/L    CO2 23.4 22.0 - 29.0 mmol/L    Calcium 9.2 8.6 - 10.5 mg/dL    Total Protein 7.2 6.0 - 8.5 g/dL    Albumin 4.6 3.5 - 5.2 g/dL    ALT (SGPT) 37 (H) 1 - 33 U/L    AST (SGOT) 35 (H) 1 - 32 U/L    Alkaline Phosphatase 89 39 - 117 U/L    Total Bilirubin 0.6 0.0 - 1.2 mg/dL    Globulin 2.6 gm/dL    A/G Ratio 1.8 g/dL    BUN/Creatinine Ratio 16.6 7.0 - 25.0    Anion Gap 13.6 5.0 - 15.0 mmol/L    eGFR 95.1 >60.0 mL/min/1.73   Urinalysis With Culture If Indicated - Urine, Clean Catch    Collection Time: 06/28/25 11:42 AM    Specimen: Urine, Clean Catch   Result Value Ref Range    Color, UA Yellow Yellow, Straw    Appearance, UA Clear Clear    pH, UA 8.5 (H) 5.0 - 8.0    Specific Gravity, UA 1.009 1.005 - 1.030    Glucose, UA Negative Negative    Ketones, UA Negative Negative    Bilirubin, UA Negative Negative    Blood, UA Negative Negative    Protein, UA Negative Negative    Leuk Esterase, UA Trace (A) Negative    Nitrite, UA Negative Negative    Urobilinogen, UA 0.2 E.U./dL 0.2 - 1.0 E.U./dL   C-reactive Protein    Collection Time: 06/28/25 11:42 AM    Specimen: Arm, Right; Blood   Result Value Ref Range    C-Reactive Protein 0.60 (H) 0.00 - 0.50 mg/dL   CBC Auto Differential    Collection Time: 06/28/25 11:42 AM    Specimen: Arm, Right; Blood   Result Value Ref Range    WBC 4.43 3.40 - 10.80 10*3/mm3    RBC 5.03 3.77 - 5.28 10*6/mm3    Hemoglobin 15.2 12.0 - 15.9 g/dL    Hematocrit 45.5 34.0 - 46.6 %    MCV 90.5 79.0 - 97.0 fL    MCH 30.2 26.6 - 33.0 pg    MCHC 33.4 31.5 - 35.7 g/dL    RDW 13.1 12.3 - 15.4 %    RDW-SD 43.3 37.0 - 54.0 fl    MPV 10.2 6.0 - 12.0 fL    Platelets 190 140 - 450 10*3/mm3    Neutrophil % 70.9 42.7 - 76.0 %    Lymphocyte % 11.5 (L) 19.6 - 45.3 %    Monocyte % 15.1 (H) 5.0 - 12.0 %    Eosinophil % 1.1 0.3 - 6.2 %    Basophil % 0.9 0.0 - 1.5 %    Immature Grans % 0.5 0.0 - 0.5 %    Neutrophils, Absolute 3.14 1.70 - 7.00 10*3/mm3    Lymphocytes,  Absolute 0.51 (L) 0.70 - 3.10 10*3/mm3    Monocytes, Absolute 0.67 0.10 - 0.90 10*3/mm3    Eosinophils, Absolute 0.05 0.00 - 0.40 10*3/mm3    Basophils, Absolute 0.04 0.00 - 0.20 10*3/mm3    Immature Grans, Absolute 0.02 0.00 - 0.05 10*3/mm3    nRBC 0.0 0.0 - 0.2 /100 WBC   Urinalysis, Microscopic Only - Urine, Clean Catch    Collection Time: 06/28/25 11:42 AM    Specimen: Urine, Clean Catch   Result Value Ref Range    RBC, UA 3-5 (A) None Seen, 0-2 /HPF    WBC, UA 0-2 None Seen, 0-2 /HPF    Bacteria, UA None Seen None Seen /HPF    Squamous Epithelial Cells, UA 0-2 None Seen, 0-2 /HPF    Hyaline Casts, UA 0-2 None Seen /LPF    Methodology Automated Microscopy    Green Top (Gel)    Collection Time: 06/28/25 11:42 AM   Result Value Ref Range    Extra Tube Hold for add-ons.    Lavender Top    Collection Time: 06/28/25 11:42 AM   Result Value Ref Range    Extra Tube hold for add-on    Gold Top - SST    Collection Time: 06/28/25 11:42 AM   Result Value Ref Range    Extra Tube Hold for add-ons.    Light Blue Top    Collection Time: 06/28/25 11:42 AM   Result Value Ref Range    Extra Tube Hold for add-ons.       CT Lumbar Spine Without Contrast   Final Result       No acute fracture or dislocation.    No chronic compression fracture deformity or subluxation.   No scoliosis.   Small diffuse posterior bulging discs throughout the mid and lower   lumbar spine levels with associated mild central canal stenoses and mild   bilateral neural foraminal stenoses.   Nonobstructive bilateral nephrolithiasis.   No features of discitis.   No lytic or blastic lesion.   No abscess or hematoma.           This report was finalized on 6/28/2025 2:12 PM by Dilshad Hughes MD.               ED Course                                                       Medical Decision Making  Patient is a 65-year-old female with significant past medical history positive for arthritis, presenting to the ER complaints of low back pain and ear pain.  Patient  denies any saddle numbness or loss of bowel or bladder.  Patient reports no injury.  Patient denies any dysuria or urinary frequency or difficulty urinating.  Patient denies any hematuria.  Patient report her ears are also hurting.  Patient had sinus pressure last week.  Patient patient reports the sinus pressure has improved.  Patient denies any additional symptoms at this time.    MDM:    Escalation of care including admission/observation considered    - Discussions of management with other providers:  None    - Discussed/reviewed with Radiology regarding test interpretation    - Independent interpretation: Labs    - Additional patient history obtained from: None    - Review of external non-ED record (if available):  Prior Inpt record, Office record, Outpt record, Prior Outpt labs, PCP record, Outside ED record, Other    - Chronic conditions affecting care: See HPI and medical Hx.    - Social Determinants of health significantly affecting care:  None        Medical Decision Making Discussion:    Degenerative disc disease    The patient has been given very strict return precautions to return to the emergency department should there be any acute change or worsening of their condition.  I have explained my findings and the patient has expressed understanding to me.  I explained that the work-up performed in the ED has been based on the specific complaint and concern, as the nature of emergency medicine is complaint driven and they understand that new symptoms may arise.  I have told them that, should there be any new symptoms, worsening or changing symptoms, a new work-up may be indicated that they are encouraged to return to the emergency department or promptly contact their primary care physician. We have employed a shared decision-making process as the discussion of their disposition.  The patient has been educated as to the nature of the visit, the tests and work-up performed and the findings from today's visit.  At this time, there does not appear to be any acute emergent process that necessitates admission to the hospital, however, the patient understands that this can change unexpectedly. At this time, the patient is stable for discharge home and agrees to follow-up with her primary care physician in the next 24 to 48 hours or earlier should they be able to obtain an appointment.    The patient was counseled regarding diagnostic results and treatment plan and patient has indicated understanding of these instructions.    Problems Addressed:  Bulge of lumbar disc without myelopathy: complicated acute illness or injury    Amount and/or Complexity of Data Reviewed  Labs: ordered.  Radiology: ordered.    Risk  Prescription drug management.        Final diagnoses:   Bulge of lumbar disc without myelopathy       ED Disposition  ED Disposition       ED Disposition   Discharge    Condition   Stable    Comment   --               Hakan Jordan PA  [address] [place] [id number] [phone]    Schedule an appointment as soon as possible for a visit            Medication List        New Prescriptions      cyclobenzaprine 10 MG tablet  Commonly known as: FLEXERIL  Take 1 tablet by mouth 3 (Three) Times a Day As Needed for Muscle Spasms.     lidocaine 5 %  Commonly known as: LIDODERM  Place 1 patch on the skin as directed by provider Daily. Remove after 12 hours.               Where to Get Your Medications        These medications were sent to [facility] [id number] - [place] - [address] AT [facility] [address] - [phone]  - [phone]   [address] [place] [address]      Phone: [phone]   cyclobenzaprine 10 MG tablet  lidocaine 5 %            Andie Mustafa, APRN  06/28/25 6030

## 2025-06-30 ENCOUNTER — HOSPITAL ENCOUNTER (OUTPATIENT)
Dept: BONE DENSITY | Facility: HOSPITAL | Age: 65
Discharge: HOME | End: 2025-06-30
Payer: COMMERCIAL

## 2025-06-30 ENCOUNTER — HOSPITAL ENCOUNTER (OUTPATIENT)
Dept: MAMMOGRAPHY | Facility: HOSPITAL | Age: 65
Discharge: HOME | End: 2025-06-30
Payer: COMMERCIAL

## 2025-06-30 DIAGNOSIS — R92.8 ABNORMAL MAMMOGRAM: ICD-10-CM

## 2025-06-30 DIAGNOSIS — Z13.820 SCREENING FOR OSTEOPOROSIS: ICD-10-CM

## 2025-06-30 PROCEDURE — 77065 DX MAMMO INCL CAD UNI: CPT

## 2025-06-30 PROCEDURE — 77061 BREAST TOMOSYNTHESIS UNI: CPT | Performed by: RADIOLOGY

## 2025-06-30 PROCEDURE — G0279 TOMOSYNTHESIS, MAMMO: HCPCS

## 2025-06-30 PROCEDURE — 77080 DXA BONE DENSITY AXIAL: CPT | Performed by: RADIOLOGY

## 2025-06-30 PROCEDURE — 77080 DXA BONE DENSITY AXIAL: CPT

## 2025-06-30 PROCEDURE — 77065 DX MAMMO INCL CAD UNI: CPT | Performed by: RADIOLOGY

## 2025-07-17 ENCOUNTER — TELEPHONE (OUTPATIENT)
Dept: CARDIOLOGY | Facility: CLINIC | Age: 65
End: 2025-07-17
Payer: COMMERCIAL

## 2025-07-17 NOTE — TELEPHONE ENCOUNTER
Caller: Annmarie Hopkins    Relationship to patient: Self    Best call back number: [phone]    Chief complaint: WANTS TO MOVE HER APPT UP TO SEPT TO COINCIDE WITH HER [facility]PS APPT. DOESN'T HAVE TO BE SAME DAY, JUST AT THE END OF SEPTEMBER     Type of visit: 6 MONTH    Requested date: END OF SEPT NEEDS TO BE SAME DAY AS HER  MR. HOPKINS.      If rescheduling, when is the original appointment: 10.20.25

## 2025-07-17 NOTE — TELEPHONE ENCOUNTER
Caller: Annmarie Hopkins    Relationship to patient: Self    Best call back number: [phone]    Patient is needing: PATIENT RETURNING CALL TO [place]. CONFIRMED

## 2025-07-25 DIAGNOSIS — I48.0 PAROXYSMAL ATRIAL FIBRILLATION: ICD-10-CM

## 2025-07-25 RX ORDER — RIVAROXABAN 20 MG/1
20 TABLET, FILM COATED ORAL DAILY
Qty: 90 TABLET | Refills: 0 | OUTPATIENT
Start: 2025-07-25

## 2025-08-07 ENCOUNTER — TRANSCRIBE ORDERS (OUTPATIENT)
Dept: ADMINISTRATIVE | Facility: HOSPITAL | Age: 65
End: 2025-08-07
Payer: COMMERCIAL

## 2025-08-07 ENCOUNTER — HOSPITAL ENCOUNTER (OUTPATIENT)
Dept: GENERAL RADIOLOGY | Facility: HOSPITAL | Age: 65
Discharge: HOME | End: 2025-08-07
Payer: COMMERCIAL

## 2025-08-07 DIAGNOSIS — M25.532 LEFT WRIST PAIN: Primary | ICD-10-CM

## 2025-08-07 DIAGNOSIS — M25.532 LEFT WRIST PAIN: ICD-10-CM

## 2025-08-07 PROCEDURE — 73090 X-RAY EXAM OF FOREARM: CPT

## 2025-08-07 PROCEDURE — 73110 X-RAY EXAM OF WRIST: CPT
